# Patient Record
Sex: FEMALE | Race: WHITE | NOT HISPANIC OR LATINO | Employment: FULL TIME | URBAN - METROPOLITAN AREA
[De-identification: names, ages, dates, MRNs, and addresses within clinical notes are randomized per-mention and may not be internally consistent; named-entity substitution may affect disease eponyms.]

---

## 2023-02-22 PROBLEM — R87.612 LGSIL ON PAP SMEAR OF CERVIX: Status: ACTIVE | Noted: 2023-02-22

## 2023-02-22 PROBLEM — B35.1 ONYCHOMYCOSIS OF TOENAIL: Status: ACTIVE | Noted: 2023-02-22

## 2023-02-22 PROBLEM — S83.412A: Status: ACTIVE | Noted: 2023-02-22

## 2023-02-22 PROBLEM — M25.662 DECREASED RANGE OF MOTION OF LEFT KNEE: Status: ACTIVE | Noted: 2023-02-22

## 2023-02-22 PROBLEM — F17.200 NICOTINE DEPENDENCE: Status: ACTIVE | Noted: 2023-02-22

## 2023-02-22 PROBLEM — E55.9 VITAMIN D DEFICIENCY: Status: ACTIVE | Noted: 2023-02-22

## 2023-02-22 PROBLEM — F43.9 STRESS: Status: ACTIVE | Noted: 2023-02-22

## 2023-02-22 PROBLEM — J44.9 CHRONIC OBSTRUCTIVE PULMONARY DISEASE (MULTI): Status: ACTIVE | Noted: 2023-02-22

## 2023-02-22 PROBLEM — M25.561 KNEE PAIN, BILATERAL: Status: ACTIVE | Noted: 2023-02-22

## 2023-02-22 PROBLEM — R29.898 DECREASED STRENGTH INVOLVING KNEE JOINT: Status: ACTIVE | Noted: 2023-02-22

## 2023-02-22 PROBLEM — M17.9 OSTEOARTHRITIS OF KNEE: Status: ACTIVE | Noted: 2023-02-22

## 2023-02-22 PROBLEM — M19.90 ARTHRITIS: Status: ACTIVE | Noted: 2023-02-22

## 2023-02-22 PROBLEM — R79.89 ABNORMAL THYROID BLOOD TEST: Status: ACTIVE | Noted: 2023-02-22

## 2023-02-22 PROBLEM — L23.9 ALLERGIC CONTACT DERMATITIS: Status: ACTIVE | Noted: 2023-02-22

## 2023-02-22 PROBLEM — R63.5 WEIGHT GAIN: Status: ACTIVE | Noted: 2023-02-22

## 2023-02-22 PROBLEM — D53.1 MEGALOBLASTIC ERYTHROCYTES: Status: ACTIVE | Noted: 2023-02-22

## 2023-02-22 PROBLEM — M17.12 ARTHRITIS OF KNEE, LEFT: Status: ACTIVE | Noted: 2023-02-22

## 2023-02-22 PROBLEM — L98.9 SKIN LESION: Status: ACTIVE | Noted: 2023-02-22

## 2023-02-22 PROBLEM — R79.89 ABNORMAL LIVER FUNCTION TEST: Status: ACTIVE | Noted: 2023-02-22

## 2023-02-22 PROBLEM — G47.33 OBSTRUCTIVE SLEEP APNEA: Status: ACTIVE | Noted: 2023-02-22

## 2023-02-22 PROBLEM — I10 HYPERTENSION: Status: ACTIVE | Noted: 2023-02-22

## 2023-02-22 PROBLEM — I45.81 PROLONGED QT SYNDROME: Status: ACTIVE | Noted: 2023-02-22

## 2023-02-22 PROBLEM — M89.9 BONE DISEASE: Status: ACTIVE | Noted: 2023-02-22

## 2023-02-22 PROBLEM — R06.00 DYSPNEA: Status: ACTIVE | Noted: 2023-02-22

## 2023-02-22 PROBLEM — S83.242A TEAR OF MEDIAL MENISCUS OF LEFT KNEE: Status: ACTIVE | Noted: 2023-02-22

## 2023-02-22 PROBLEM — M22.2X2 PATELLOFEMORAL PAIN SYNDROME OF LEFT KNEE: Status: ACTIVE | Noted: 2023-02-22

## 2023-02-22 PROBLEM — M23.92 LOCKING OF LEFT KNEE: Status: ACTIVE | Noted: 2023-02-22

## 2023-02-22 PROBLEM — R06.02 SHORTNESS OF BREATH: Status: ACTIVE | Noted: 2023-02-22

## 2023-02-22 PROBLEM — M21.161 ACQUIRED GENU VARUM OF RIGHT LOWER EXTREMITY: Status: ACTIVE | Noted: 2023-02-22

## 2023-02-22 PROBLEM — R68.89 HEAT INTOLERANCE: Status: ACTIVE | Noted: 2023-02-22

## 2023-02-22 PROBLEM — R26.2 DIFFICULTY WALKING: Status: ACTIVE | Noted: 2023-02-22

## 2023-02-22 PROBLEM — R23.2 HOT FLASHES: Status: ACTIVE | Noted: 2023-02-22

## 2023-02-22 PROBLEM — D36.9 TUBULAR ADENOMA: Status: ACTIVE | Noted: 2023-02-22

## 2023-02-22 PROBLEM — M25.562 LEFT KNEE PAIN: Status: ACTIVE | Noted: 2023-02-22

## 2023-02-22 PROBLEM — M25.562 KNEE PAIN, BILATERAL: Status: ACTIVE | Noted: 2023-02-22

## 2023-02-22 PROBLEM — M25.462 KNEE EFFUSION, LEFT: Status: ACTIVE | Noted: 2023-02-22

## 2023-02-22 PROBLEM — E04.1 THYROID NODULE: Status: ACTIVE | Noted: 2023-02-22

## 2023-02-22 PROBLEM — M54.2 CERVICAL PAIN: Status: ACTIVE | Noted: 2023-02-22

## 2023-02-22 PROBLEM — F33.9 MAJOR DEPRESSION, RECURRENT (CMS-HCC): Status: RESOLVED | Noted: 2023-02-22 | Resolved: 2023-02-22

## 2023-02-22 PROBLEM — R87.810 ASCUS WITH POSITIVE HIGH RISK HPV CERVICAL: Status: ACTIVE | Noted: 2023-02-22

## 2023-02-22 PROBLEM — N90.89 VULVAR LESION: Status: ACTIVE | Noted: 2023-02-22

## 2023-02-22 PROBLEM — E78.9 LIPID DISORDER: Status: ACTIVE | Noted: 2023-02-22

## 2023-02-22 PROBLEM — M25.661 KNEE JOINT STIFFNESS, BILATERAL: Status: ACTIVE | Noted: 2023-02-22

## 2023-02-22 PROBLEM — R91.1 PULMONARY NODULE SEEN ON IMAGING STUDY: Status: ACTIVE | Noted: 2023-02-22

## 2023-02-22 PROBLEM — M25.662 KNEE JOINT STIFFNESS, BILATERAL: Status: ACTIVE | Noted: 2023-02-22

## 2023-02-22 PROBLEM — R87.610 ASCUS WITH POSITIVE HIGH RISK HPV CERVICAL: Status: ACTIVE | Noted: 2023-02-22

## 2023-02-22 PROBLEM — E07.9 THYROID DISEASE: Status: ACTIVE | Noted: 2023-02-22

## 2023-02-22 PROBLEM — M17.11 ARTHRITIS OF KNEE, RIGHT: Status: ACTIVE | Noted: 2023-02-22

## 2023-02-22 PROBLEM — K43.9 VENTRAL HERNIA: Status: ACTIVE | Noted: 2023-02-22

## 2023-02-22 PROBLEM — E66.9 OBESITY: Status: ACTIVE | Noted: 2023-02-22

## 2023-02-22 PROBLEM — M84.375A METATARSAL STRESS FRACTURE OF LEFT FOOT: Status: ACTIVE | Noted: 2023-02-22

## 2023-02-22 RX ORDER — MELOXICAM 7.5 MG/1
1 TABLET ORAL 2 TIMES DAILY PRN
COMMUNITY
Start: 2022-05-26 | End: 2024-04-30 | Stop reason: SDUPTHER

## 2023-02-22 RX ORDER — ATORVASTATIN CALCIUM 10 MG/1
10 TABLET, FILM COATED ORAL NIGHTLY
COMMUNITY
End: 2023-04-04 | Stop reason: SDUPTHER

## 2023-02-22 RX ORDER — BUPROPION HYDROCHLORIDE 300 MG/1
300 TABLET ORAL DAILY
COMMUNITY
End: 2023-09-29

## 2023-02-22 RX ORDER — FLUTICASONE PROPIONATE 50 MCG
1 SPRAY, SUSPENSION (ML) NASAL DAILY
COMMUNITY
End: 2023-11-14 | Stop reason: ALTCHOICE

## 2023-02-22 RX ORDER — CITALOPRAM 40 MG/1
1 TABLET, FILM COATED ORAL DAILY
COMMUNITY
Start: 2013-10-03 | End: 2023-03-08 | Stop reason: SDUPTHER

## 2023-02-22 RX ORDER — LOSARTAN POTASSIUM 50 MG/1
1 TABLET ORAL DAILY
COMMUNITY
Start: 2019-03-12 | End: 2023-04-04 | Stop reason: SDUPTHER

## 2023-03-08 DIAGNOSIS — F41.9 ANXIETY: Primary | ICD-10-CM

## 2023-03-15 RX ORDER — CITALOPRAM 40 MG/1
40 TABLET, FILM COATED ORAL DAILY
Qty: 90 TABLET | Refills: 3 | Status: SHIPPED | OUTPATIENT
Start: 2023-03-15

## 2023-04-03 PROBLEM — M25.569 KNEE PAIN: Status: ACTIVE | Noted: 2023-04-03

## 2023-04-03 PROBLEM — R06.02 SOB (SHORTNESS OF BREATH) ON EXERTION: Status: ACTIVE | Noted: 2023-04-03

## 2023-04-03 PROBLEM — R06.02 EXERTIONAL SHORTNESS OF BREATH: Status: ACTIVE | Noted: 2023-04-03

## 2023-04-03 PROBLEM — F17.210 CIGARETTE NICOTINE DEPENDENCE WITHOUT COMPLICATION: Status: ACTIVE | Noted: 2023-04-03

## 2023-04-03 PROBLEM — R06.00 DYSPNEA: Status: ACTIVE | Noted: 2023-04-03

## 2023-04-03 PROBLEM — R91.8 LUNG MASS: Status: ACTIVE | Noted: 2023-04-03

## 2023-04-03 PROBLEM — S83.419A KNEE MCL SPRAIN: Status: ACTIVE | Noted: 2023-04-03

## 2023-04-03 PROBLEM — E04.1 SOLITARY THYROID NODULE: Status: ACTIVE | Noted: 2023-04-03

## 2023-04-03 PROBLEM — E78.5 DYSLIPIDEMIA: Status: ACTIVE | Noted: 2023-04-03

## 2023-04-04 ENCOUNTER — OFFICE VISIT (OUTPATIENT)
Dept: PRIMARY CARE | Facility: CLINIC | Age: 58
End: 2023-04-04
Payer: COMMERCIAL

## 2023-04-04 VITALS
SYSTOLIC BLOOD PRESSURE: 119 MMHG | BODY MASS INDEX: 38.14 KG/M2 | TEMPERATURE: 96.6 F | DIASTOLIC BLOOD PRESSURE: 67 MMHG | HEART RATE: 77 BPM | WEIGHT: 243 LBS | HEIGHT: 67 IN

## 2023-04-04 DIAGNOSIS — M17.11 ARTHRITIS OF KNEE, RIGHT: ICD-10-CM

## 2023-04-04 DIAGNOSIS — E55.9 VITAMIN D DEFICIENCY: ICD-10-CM

## 2023-04-04 DIAGNOSIS — M17.12 ARTHRITIS OF KNEE, LEFT: ICD-10-CM

## 2023-04-04 DIAGNOSIS — R79.89 ABNORMAL LIVER FUNCTION TEST: ICD-10-CM

## 2023-04-04 DIAGNOSIS — I10 HYPERTENSION, UNSPECIFIED TYPE: Primary | ICD-10-CM

## 2023-04-04 DIAGNOSIS — G47.33 OBSTRUCTIVE SLEEP APNEA: ICD-10-CM

## 2023-04-04 LAB
ALANINE AMINOTRANSFERASE (SGPT) (U/L) IN SER/PLAS: 20 U/L (ref 7–45)
ALBUMIN (G/DL) IN SER/PLAS: 4.3 G/DL (ref 3.4–5)
ALKALINE PHOSPHATASE (U/L) IN SER/PLAS: 115 U/L (ref 33–110)
ASPARTATE AMINOTRANSFERASE (SGOT) (U/L) IN SER/PLAS: 15 U/L (ref 9–39)
BILIRUBIN DIRECT (MG/DL) IN SER/PLAS: 0.1 MG/DL (ref 0–0.3)
BILIRUBIN TOTAL (MG/DL) IN SER/PLAS: 0.5 MG/DL (ref 0–1.2)
CHOLESTEROL (MG/DL) IN SER/PLAS: 161 MG/DL (ref 0–199)
CHOLESTEROL IN HDL (MG/DL) IN SER/PLAS: 44.1 MG/DL
CHOLESTEROL/HDL RATIO: 3.7
LDL: 84 MG/DL (ref 0–99)
PROTEIN TOTAL: 6.8 G/DL (ref 6.4–8.2)
TRIGLYCERIDE (MG/DL) IN SER/PLAS: 167 MG/DL (ref 0–149)
VLDL: 33 MG/DL (ref 0–40)

## 2023-04-04 PROCEDURE — 1036F TOBACCO NON-USER: CPT | Performed by: FAMILY MEDICINE

## 2023-04-04 PROCEDURE — 3078F DIAST BP <80 MM HG: CPT | Performed by: FAMILY MEDICINE

## 2023-04-04 PROCEDURE — 80076 HEPATIC FUNCTION PANEL: CPT

## 2023-04-04 PROCEDURE — 99214 OFFICE O/P EST MOD 30 MIN: CPT | Performed by: FAMILY MEDICINE

## 2023-04-04 PROCEDURE — 36415 COLL VENOUS BLD VENIPUNCTURE: CPT | Performed by: FAMILY MEDICINE

## 2023-04-04 PROCEDURE — 80061 LIPID PANEL: CPT

## 2023-04-04 PROCEDURE — 3074F SYST BP LT 130 MM HG: CPT | Performed by: FAMILY MEDICINE

## 2023-04-04 RX ORDER — LOSARTAN POTASSIUM 50 MG/1
50 TABLET ORAL DAILY
Qty: 90 TABLET | Refills: 3 | Status: SHIPPED | OUTPATIENT
Start: 2023-04-04 | End: 2024-02-28

## 2023-04-04 RX ORDER — ATORVASTATIN CALCIUM 10 MG/1
10 TABLET, FILM COATED ORAL NIGHTLY
Qty: 3 TABLET | Refills: 3 | Status: SHIPPED | OUTPATIENT
Start: 2023-04-04 | End: 2023-06-05

## 2023-04-04 NOTE — PROGRESS NOTES
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

## 2023-06-04 DIAGNOSIS — I10 HYPERTENSION, UNSPECIFIED TYPE: ICD-10-CM

## 2023-06-05 RX ORDER — ATORVASTATIN CALCIUM 10 MG/1
TABLET, FILM COATED ORAL
Qty: 90 TABLET | Refills: 1 | Status: SHIPPED | OUTPATIENT
Start: 2023-06-05 | End: 2023-12-01

## 2023-09-27 ENCOUNTER — TELEPHONE (OUTPATIENT)
Dept: PRIMARY CARE | Facility: CLINIC | Age: 58
End: 2023-09-27
Payer: COMMERCIAL

## 2023-09-27 DIAGNOSIS — E55.9 VITAMIN D DEFICIENCY: ICD-10-CM

## 2023-09-27 DIAGNOSIS — I10 PRIMARY HYPERTENSION: Primary | ICD-10-CM

## 2023-09-27 DIAGNOSIS — Z00.00 HEALTH CARE MAINTENANCE: ICD-10-CM

## 2023-09-27 NOTE — TELEPHONE ENCOUNTER
Pt called states that she has an appointment for a physical on 10/30 and would like it if she can have her blood  drawn  prior to the appointment so that the results can be disused at the appointment

## 2023-09-29 DIAGNOSIS — E66.9 OBESITY, UNSPECIFIED: ICD-10-CM

## 2023-09-29 RX ORDER — BUPROPION HYDROCHLORIDE 300 MG/1
300 TABLET ORAL DAILY
Qty: 90 TABLET | Refills: 3 | Status: SHIPPED | OUTPATIENT
Start: 2023-09-29

## 2023-10-24 ENCOUNTER — CLINICAL SUPPORT (OUTPATIENT)
Dept: PRIMARY CARE | Facility: CLINIC | Age: 58
End: 2023-10-24
Payer: COMMERCIAL

## 2023-10-24 DIAGNOSIS — E55.9 VITAMIN D DEFICIENCY: ICD-10-CM

## 2023-10-24 DIAGNOSIS — Z00.00 HEALTH CARE MAINTENANCE: ICD-10-CM

## 2023-10-24 LAB
25(OH)D3 SERPL-MCNC: 41 NG/ML (ref 30–100)
ALBUMIN SERPL BCP-MCNC: 4.5 G/DL (ref 3.4–5)
ALP SERPL-CCNC: 100 U/L (ref 33–110)
ALT SERPL W P-5'-P-CCNC: 14 U/L (ref 7–45)
ANION GAP SERPL CALC-SCNC: 16 MMOL/L (ref 10–20)
AST SERPL W P-5'-P-CCNC: 13 U/L (ref 9–39)
BASOPHILS # BLD AUTO: 0.04 X10*3/UL (ref 0–0.1)
BASOPHILS NFR BLD AUTO: 0.6 %
BILIRUB SERPL-MCNC: 0.4 MG/DL (ref 0–1.2)
BUN SERPL-MCNC: 17 MG/DL (ref 6–23)
CALCIUM SERPL-MCNC: 9.5 MG/DL (ref 8.6–10.6)
CHLORIDE SERPL-SCNC: 108 MMOL/L (ref 98–107)
CO2 SERPL-SCNC: 22 MMOL/L (ref 21–32)
CREAT SERPL-MCNC: 1.05 MG/DL (ref 0.5–1.05)
EOSINOPHIL # BLD AUTO: 0.15 X10*3/UL (ref 0–0.7)
EOSINOPHIL NFR BLD AUTO: 2.1 %
ERYTHROCYTE [DISTWIDTH] IN BLOOD BY AUTOMATED COUNT: 12.7 % (ref 11.5–14.5)
GFR SERPL CREATININE-BSD FRML MDRD: 61 ML/MIN/1.73M*2
GLUCOSE SERPL-MCNC: 112 MG/DL (ref 74–99)
HCT VFR BLD AUTO: 41.1 % (ref 36–46)
HGB BLD-MCNC: 13.3 G/DL (ref 12–16)
IMM GRANULOCYTES # BLD AUTO: 0.03 X10*3/UL (ref 0–0.7)
IMM GRANULOCYTES NFR BLD AUTO: 0.4 % (ref 0–0.9)
LYMPHOCYTES # BLD AUTO: 1.92 X10*3/UL (ref 1.2–4.8)
LYMPHOCYTES NFR BLD AUTO: 27 %
MCH RBC QN AUTO: 31.2 PG (ref 26–34)
MCHC RBC AUTO-ENTMCNC: 32.4 G/DL (ref 32–36)
MCV RBC AUTO: 97 FL (ref 80–100)
MONOCYTES # BLD AUTO: 0.38 X10*3/UL (ref 0.1–1)
MONOCYTES NFR BLD AUTO: 5.3 %
NEUTROPHILS # BLD AUTO: 4.59 X10*3/UL (ref 1.2–7.7)
NEUTROPHILS NFR BLD AUTO: 64.6 %
NRBC BLD-RTO: 0 /100 WBCS (ref 0–0)
PLATELET # BLD AUTO: 234 X10*3/UL (ref 150–450)
PMV BLD AUTO: 10.9 FL (ref 7.5–11.5)
POTASSIUM SERPL-SCNC: 4.6 MMOL/L (ref 3.5–5.3)
PROT SERPL-MCNC: 7 G/DL (ref 6.4–8.2)
RBC # BLD AUTO: 4.26 X10*6/UL (ref 4–5.2)
SODIUM SERPL-SCNC: 141 MMOL/L (ref 136–145)
TSH SERPL-ACNC: 0.98 MIU/L (ref 0.44–3.98)
WBC # BLD AUTO: 7.1 X10*3/UL (ref 4.4–11.3)

## 2023-10-24 PROCEDURE — 80053 COMPREHEN METABOLIC PANEL: CPT

## 2023-10-24 PROCEDURE — 82306 VITAMIN D 25 HYDROXY: CPT

## 2023-10-24 PROCEDURE — 84443 ASSAY THYROID STIM HORMONE: CPT

## 2023-10-24 PROCEDURE — 36415 COLL VENOUS BLD VENIPUNCTURE: CPT

## 2023-10-24 PROCEDURE — 85025 COMPLETE CBC W/AUTO DIFF WBC: CPT

## 2023-10-30 ENCOUNTER — OFFICE VISIT (OUTPATIENT)
Dept: PRIMARY CARE | Facility: CLINIC | Age: 58
End: 2023-10-30
Payer: COMMERCIAL

## 2023-10-30 VITALS
WEIGHT: 248.9 LBS | DIASTOLIC BLOOD PRESSURE: 65 MMHG | BODY MASS INDEX: 39.07 KG/M2 | HEIGHT: 67 IN | SYSTOLIC BLOOD PRESSURE: 105 MMHG | TEMPERATURE: 97.8 F

## 2023-10-30 DIAGNOSIS — F17.210 SMOKING GREATER THAN 25 PACK YEARS: Primary | ICD-10-CM

## 2023-10-30 DIAGNOSIS — F43.9 STRESS: ICD-10-CM

## 2023-10-30 DIAGNOSIS — Z00.00 HEALTHCARE MAINTENANCE: ICD-10-CM

## 2023-10-30 LAB
APPEARANCE UR: CLEAR
BILIRUB UR QL STRIP: NEGATIVE
COLOR UR: YELLOW
GLUCOSE UR STRIP-MCNC: NEGATIVE MG/DL
HGB UR QL STRIP: NEGATIVE
KETONES UR STRIP-MCNC: NEGATIVE MG/DL
LEUKOCYTE ESTERASE UR QL STRIP: NEGATIVE
NITRITE UR QL STRIP: NEGATIVE
PH UR STRIP: 5.5 [PH]
PROT UR STRIP-MCNC: NEGATIVE MG/DL
SP GR UR STRIP.AUTO: <=1.005
UROBILINOGEN UR STRIP-ACNC: 0.2 E.U./DL

## 2023-10-30 PROCEDURE — 3078F DIAST BP <80 MM HG: CPT | Performed by: FAMILY MEDICINE

## 2023-10-30 PROCEDURE — 1036F TOBACCO NON-USER: CPT | Performed by: FAMILY MEDICINE

## 2023-10-30 PROCEDURE — 81003 URINALYSIS AUTO W/O SCOPE: CPT | Performed by: FAMILY MEDICINE

## 2023-10-30 PROCEDURE — 3074F SYST BP LT 130 MM HG: CPT | Performed by: FAMILY MEDICINE

## 2023-10-30 PROCEDURE — 99396 PREV VISIT EST AGE 40-64: CPT | Performed by: FAMILY MEDICINE

## 2023-10-30 NOTE — PATIENT INSTRUCTIONS
There are 6 petals that I want my patient to work on to activate the longevity genes .    Diet  2.movement    3.sleep    4. Mental health     5. Environment     6. Relationships

## 2023-10-30 NOTE — PROGRESS NOTES
"Subjective   Patient ID: Shayy Weeks is a 58 y.o. female who presents for Annual Exam.    HPI     Review of Systems   All other systems reviewed and are negative.      Objective   /65   Temp 36.6 °C (97.8 °F)   Ht 1.702 m (5' 7\")   Wt 113 kg (248 lb 14.4 oz)   BMI 38.98 kg/m²     Physical Exam  Cardiovascular:      Rate and Rhythm: Normal rate.   Pulmonary:      Effort: Pulmonary effort is normal.   Abdominal:      Palpations: Abdomen is soft.   Skin:     General: Skin is warm.   Neurological:      General: No focal deficit present.      Mental Status: She is alert.   Psychiatric:         Mood and Affect: Mood normal.         Assessment/Plan     This is a 58-year-old female patient who is here for her annual well exam    She is not yet ready to change her lifestyle for healthy living.  She has been going to the the pool Monday Wednesday and Thursday for water aerobics    But she is continuing to eat unhealthy especially sweets    We had a long discussion about lifestyle changes    Since she has a history of changed smoking over 25 years I will order another CAT scan to be done sometime next year January or February    I reviewed her medication no changes were made    She is under stress may be posttraumatic stress disorder for her to lead herself to eating unhealthy [facility]    She has been wearing her CPAP diligently    Advised patient to come back in 6 months         "

## 2023-11-14 ENCOUNTER — OFFICE VISIT (OUTPATIENT)
Dept: CARDIOLOGY | Facility: HOSPITAL | Age: 58
End: 2023-11-14
Payer: COMMERCIAL

## 2023-11-14 VITALS
HEART RATE: 70 BPM | HEIGHT: 67 IN | OXYGEN SATURATION: 98 % | SYSTOLIC BLOOD PRESSURE: 125 MMHG | DIASTOLIC BLOOD PRESSURE: 78 MMHG | WEIGHT: 250.6 LBS | BODY MASS INDEX: 39.33 KG/M2

## 2023-11-14 DIAGNOSIS — R06.02 SOB (SHORTNESS OF BREATH) ON EXERTION: Primary | ICD-10-CM

## 2023-11-14 DIAGNOSIS — I10 HYPERTENSION, UNSPECIFIED TYPE: ICD-10-CM

## 2023-11-14 DIAGNOSIS — E78.5 DYSLIPIDEMIA: ICD-10-CM

## 2023-11-14 PROCEDURE — 99214 OFFICE O/P EST MOD 30 MIN: CPT | Performed by: INTERNAL MEDICINE

## 2023-11-14 PROCEDURE — 3074F SYST BP LT 130 MM HG: CPT | Performed by: INTERNAL MEDICINE

## 2023-11-14 PROCEDURE — 99214 OFFICE O/P EST MOD 30 MIN: CPT | Mod: 25 | Performed by: INTERNAL MEDICINE

## 2023-11-14 PROCEDURE — 93010 ELECTROCARDIOGRAM REPORT: CPT | Performed by: INTERNAL MEDICINE

## 2023-11-14 PROCEDURE — 93005 ELECTROCARDIOGRAM TRACING: CPT | Performed by: INTERNAL MEDICINE

## 2023-11-14 PROCEDURE — 3078F DIAST BP <80 MM HG: CPT | Performed by: INTERNAL MEDICINE

## 2023-11-14 PROCEDURE — 1036F TOBACCO NON-USER: CPT | Performed by: INTERNAL MEDICINE

## 2023-11-14 RX ORDER — SPIRONOLACTONE 25 MG/1
25 TABLET ORAL DAILY
Qty: 90 TABLET | Refills: 3 | Status: SHIPPED | OUTPATIENT
Start: 2023-11-14 | End: 2024-11-13

## 2023-11-14 ASSESSMENT — ENCOUNTER SYMPTOMS
DEPRESSION: 0
OCCASIONAL FEELINGS OF UNSTEADINESS: 0
LOSS OF SENSATION IN FEET: 0

## 2023-11-14 ASSESSMENT — PATIENT HEALTH QUESTIONNAIRE - PHQ9
SUM OF ALL RESPONSES TO PHQ9 QUESTIONS 1 AND 2: 0
2. FEELING DOWN, DEPRESSED OR HOPELESS: NOT AT ALL
1. LITTLE INTEREST OR PLEASURE IN DOING THINGS: NOT AT ALL

## 2023-11-14 NOTE — PATIENT INSTRUCTIONS
Check echocardiogram.  Start spironolactone 25 mg daily.  Start Jardiance 10 mg daily.  Follow strict urinary hygiene as discussed.  Check BMP and lipid profile in 1 week.  Check another BMP in 1 month.  Follow a low potassium diet.  Follow-up in 3 months.

## 2023-11-14 NOTE — PROGRESS NOTES
Primary Care Physician: Aubrie Dewey MD  Date of Visit: 11/14/2023  9:20 AM EST  Location of visit: [facility]     Chief Complaint:   Chief Complaint   Patient presents with    Hypertension    Hyperlipidemia    Heart Murmur    Shortness of Breath        HPI / Summary:   Shayy Weeks is a 58 y.o. female presents for followup.  She does note increased dyspnea on exertion over the last year when walking up hills.  She does participate in water exercises 3 days a week for 45 minutes without chest pain or shortness of breath.  The patient denies chest pain,  palpitations, lightheadedness, syncope, orthopnea, paroxysmal nocturnal dyspnea, lower extremity edema, or bleeding problems.          Past Medical History:  Past Medical History:   Diagnosis Date    Atypical squamous cells of undetermined significance on cytologic smear of cervix (ASC-US) 08/26/2016    ASCUS with positive high risk HPV cervical    Carpal tunnel syndrome, right upper limb 10/29/2017    Carpal tunnel syndrome of right wrist    Cervical high risk human papillomavirus (HPV) DNA test positive 06/01/2015    Cervical high risk HPV (human papillomavirus) test positive    Chronic obstructive pulmonary disease with (acute) exacerbation (CMS/HCC) 09/21/2016    COPD exacerbation    Noninfective gastroenteritis and colitis, unspecified 12/29/2016    Acute gastroenteritis    Nontoxic single thyroid nodule 09/28/2020    Solitary thyroid nodule    Osteoarthritis of knee, unspecified 04/05/2022    Osteoarthritis of knee    Other abnormal cytological findings on specimens from cervix uteri 12/19/2014    Unexplained endometrial cells on cervical Pap smear    Other muscle spasm 03/31/2016    Cervical paraspinous muscle spasm    Other muscle spasm 03/31/2016    Trapezius muscle spasm    Pain in right hand 09/07/2017    Pain of right hand    Personal history of colonic polyps 07/27/2016    History of adenomatous polyp of colon    Personal history of  other diseases of the respiratory system 06/25/2014    History of acute pharyngitis    Personal history of other diseases of the respiratory system 03/17/2014    History of acute bronchitis    Personal history of other diseases of the respiratory system 03/17/2014    History of acute sinusitis    Personal history of other endocrine, nutritional and metabolic disease 07/19/2022    History of diabetes mellitus    Personal history of other specified conditions 09/21/2016    History of wheezing    Personal history of other specified conditions 12/28/2020    History of weight gain    Personal history of other specified conditions 08/02/2013    History of abdominal pain    Radiculopathy, cervical region 03/31/2016    Cervical radiculopathy, acute    Unspecified acute lower respiratory infection 06/29/2017    Acute lower respiratory infection        Past Surgical History:  Past Surgical History:   Procedure Laterality Date    CERVICAL FUSION  08/02/2013    Cervical Vertebral Fusion    LUMBAR LAMINECTOMY  08/02/2013    Laminectomy Lumbar    OTHER SURGICAL HISTORY  08/02/2013    Oophorectomy - Bilateral (Removal Of Both Ovaries)          Social History:   reports that she has quit smoking. Her smoking use included cigarettes. She has quit using smokeless tobacco. Alcohol use questions deferred to the physician. She reports that she does not use drugs.     Family History:  family history includes CARDIAC DISORDER in her father; Frontotemporal dementia in her mother.      Allergies:  Allergies   Allergen Reactions    Bee Sting Kit Other       Outpatient Medications:  Current Outpatient Medications   Medication Instructions    atorvastatin (Lipitor) 10 mg tablet TAKE 1 TABLET BY MOUTH EVERYDAY AT BEDTIME    buPROPion XL (WELLBUTRIN XL) 300 mg, oral, Daily    citalopram (CELEXA) 40 mg, oral, Daily    losartan (COZAAR) 50 mg, oral, Daily    meloxicam (Mobic) 7.5 mg tablet 1 tablet, oral, 2 times daily PRN       Physical  "Exam:  Vitals:    11/14/23 0931   BP: 125/78   BP Location: Right arm   Patient Position: Sitting   Pulse: 70   SpO2: 98%   Weight: 114 kg (250 lb 9.6 oz)   Height: 1.702 m (5' 7\")     Wt Readings from Last 5 Encounters:   11/14/23 114 kg (250 lb 9.6 oz)   10/30/23 113 kg (248 lb 14.4 oz)   07/10/23 110 kg (243 lb)   04/04/23 110 kg (243 lb)   11/15/22 108 kg (238 lb 0.5 oz)     Body mass index is 39.25 kg/m².   General: Well-developed well-nourished in no acute distress  HEENT: Normocephalic atraumatic  Neck: Supple, JVP v 12-14cm negative hepatojugular reflux 2+ carotid pulses without bruit  Pulmonary: Normal respiratory effort, clear to auscultation  Cardiovascular: No right ventricular heave, normal S1 and S2, 2 out of 6 early peaking systolic ejection murmur no rubs or gallops  Abdomen: Soft nontender nondistended  Extremities: Warm without edema 2+ radial pulses bilaterally 2+ posterior tibial pulses bilaterally  Neurologic: Alert and oriented x3  Psychiatric: Normal mood and affect     Last Labs:  CMP:  Recent Labs     10/24/23  0854 10/27/22  1341 12/03/21  0946 10/04/21  0832 07/16/21  1427    140 139 143 141   K 4.6 4.3 4.3 4.4 4.0   * 106 105 108* 106   CO2 22 25 26 25 24   ANIONGAP 16 13 12 14 11   BUN 17 18 17 19 17   CREATININE 1.05 0.90 0.92 0.95 1.0   EGFR 61  --   --   --  61   GLUCOSE 112* 82 110* 109* 112*     Recent Labs     10/24/23  0854 04/04/23  1005 10/27/22  1341 10/11/22  1310 04/05/22  0940   ALBUMIN 4.5 4.3 4.6 4.3 4.6   ALKPHOS 100 115* 104 111* 104   ALT 14 20 22 22 17   AST 13 15 17 17 15   BILITOT 0.4 0.5 0.5 0.5 0.6     CBC:  Recent Labs     10/24/23  0854 10/27/22  1341 12/03/21  0946 10/04/21  0832 07/16/21  1427   WBC 7.1 7.5 6.5 6.4 6.7   HGB 13.3 13.4 12.8 12.5 12.6   HCT 41.1 40.5 40.4 39.9 38.5    276 241 239 216   MCV 97 96 97 101* 97.0     COAG:   Recent Labs     11/16/21  0928 07/16/21  1427   INR  --  0.9   DDIMERVTE 329  --      HEME/ENDO:  Recent Labs " "    10/24/23  0854 10/27/22  1341 10/04/21  0832 01/27/21  0947   TSH 0.98 0.82 0.62 0.59      CARDIAC: No results for input(s): \"LDH\", \"CKMB\", \"TROPHS\", \"BNP\" in the last 72512 hours.    No lab exists for component: \"CK\", \"CKMBP\"  Recent Labs     04/04/23  1005 10/27/22  1341 04/05/22  0940   CHOL 161 145 129   LDLF 84 59 62   HDL 44.1 47.5 40.8   TRIG 167* 195* 130       Last Cardiology Tests:  ECG:  Echocardiogram performed today that I reviewed shows normal sinus rhythm and is normal.    Echo:  November 23, 2021  CONCLUSIONS:   1. The left ventricular systolic function is normal with a 55-60% estimated ejection fraction.          Cath:      Stress Test:  Stress Results:  No results found for this or any previous visit from the past 365 days.         Cardiac Imaging:  CT lung screening December 15, 2022  LUNGS AND AIRWAYS:  The trachea and central airways are patent. No endobronchial lesion.  There is minimal emphysematous changes with centrilobular  ground-glass opacities consistent with a component of respiratory  bronchiolitis.  There are no suspicious lung nodules.  No focal infiltrates or effusions.  There is minimal lingular atelectasis.     MEDIASTINUM AND NICK, LOWER NECK AND AXILLA:  There is asymmetric enlargement with central calcification of the  left lobe of the thyroid gland.  There are scattered mediastinal lymph nodes which are felt to be  reactive/inflammatory in nature.  Esophagus appears within normal limits as seen.     HEART AND VESSELS:  The thoracic aorta is of normal course and caliber without vascular  calcifications.  Main pulmonary artery and its branches are normal in caliber.  No coronary artery calcifications are seen.The study is not optimized  for evaluation of coronary arteries.  The cardiac chambers are not enlarged.  No evidence of pericardial effusion.    Assessment/Plan   Diagnoses and all orders for this visit:  SOB (shortness of breath) on exertion  -     ECG 12 lead (Clinic " Performed)  -     Transthoracic echo (TTE) complete; Future  -     perflutren lipid microspheres (Definity) injection 0.5-10 mL of dilution  -     sulfur hexafluoride microsphr (Lumason) injection 24.28 mg  -     perflutren protein A microsphere (Optison) injection 0.5 mL  Dyslipidemia  -     Lipid panel; Future  Hypertension, unspecified type  -     spironolactone (Aldactone) 25 mg tablet; Take 1 tablet (25 mg) by mouth once daily.  -     empagliflozin (Jardiance) 10 mg; Take 1 tablet (10 mg) by mouth once daily.  -     Basic metabolic panel; Future  -     Basic metabolic panel; Future    In summary Ms. Weeks is a pleasant 58 year-old white female with a past medical history significant for hypertension, hyperlipidemia with a CT calcium score of 0 in 2019, obesity, obstructive sleep apnea, and prior tobacco use.  She does note increasing dyspnea on exertion over the last year and has evidence of volume overload consistent with heart failure with preserved ejection fraction in the setting of obesity and hypertension.  I did start spironolactone and Jardiance as indicated above.  I did  her with regards to the side effects of Jardiance and the need for strict urinary hygiene.  I ordered labs as indicated above.  I encouraged her to continue to work on losing weight.  I ordered an echo to reassess her LV function.  We will see her back in follow-up in 3 months.      Orders:  No orders of the defined types were placed in this encounter.     Followup Appts:  Future Appointments   Date Time Provider Department Center   2/5/2024 11:00 AM [facility] [id number] [facility]inoff [facility]   4/29/2024  8:45 AM Aubrie Dewey MD [id number]           ____________________________________________________________  Jamaal Ames MD  [facility]  [facility] [place]

## 2023-11-22 ENCOUNTER — LAB (OUTPATIENT)
Dept: LAB | Facility: LAB | Age: 58
End: 2023-11-22
Payer: COMMERCIAL

## 2023-11-22 DIAGNOSIS — I10 HYPERTENSION, UNSPECIFIED TYPE: ICD-10-CM

## 2023-11-22 DIAGNOSIS — E78.5 DYSLIPIDEMIA: ICD-10-CM

## 2023-11-22 LAB
ANION GAP SERPL CALC-SCNC: 14 MMOL/L
BUN SERPL-MCNC: 22 MG/DL (ref 8–25)
CALCIUM SERPL-MCNC: 9.3 MG/DL (ref 8.5–10.4)
CHLORIDE SERPL-SCNC: 107 MMOL/L (ref 97–107)
CHOLEST SERPL-MCNC: 126 MG/DL (ref 133–200)
CHOLEST/HDLC SERPL: 3.2 {RATIO}
CO2 SERPL-SCNC: 21 MMOL/L (ref 24–31)
CREAT SERPL-MCNC: 1.1 MG/DL (ref 0.4–1.6)
GFR SERPL CREATININE-BSD FRML MDRD: 58 ML/MIN/1.73M*2
GLUCOSE SERPL-MCNC: 119 MG/DL (ref 65–99)
HDLC SERPL-MCNC: 39 MG/DL
LDLC SERPL CALC-MCNC: 60 MG/DL (ref 65–130)
POTASSIUM SERPL-SCNC: 4.5 MMOL/L (ref 3.4–5.1)
SODIUM SERPL-SCNC: 142 MMOL/L (ref 133–145)
TRIGL SERPL-MCNC: 133 MG/DL (ref 40–150)

## 2023-11-22 PROCEDURE — 80061 LIPID PANEL: CPT

## 2023-11-22 PROCEDURE — 36415 COLL VENOUS BLD VENIPUNCTURE: CPT

## 2023-11-22 PROCEDURE — 80048 BASIC METABOLIC PNL TOTAL CA: CPT

## 2023-11-30 DIAGNOSIS — I10 HYPERTENSION, UNSPECIFIED TYPE: ICD-10-CM

## 2023-12-01 RX ORDER — ATORVASTATIN CALCIUM 10 MG/1
TABLET, FILM COATED ORAL
Qty: 90 TABLET | Refills: 1 | Status: SHIPPED | OUTPATIENT
Start: 2023-12-01 | End: 2024-05-28

## 2023-12-03 LAB
ATRIAL RATE: 70 BPM
P AXIS: 61 DEGREES
P OFFSET: 198 MS
P ONSET: 140 MS
PR INTERVAL: 156 MS
Q ONSET: 218 MS
QRS COUNT: 12 BEATS
QRS DURATION: 82 MS
QT INTERVAL: 430 MS
QTC CALCULATION(BAZETT): 464 MS
QTC FREDERICIA: 452 MS
R AXIS: 8 DEGREES
T AXIS: 29 DEGREES
T OFFSET: 433 MS
VENTRICULAR RATE: 70 BPM

## 2023-12-04 ENCOUNTER — HOSPITAL ENCOUNTER (OUTPATIENT)
Dept: CARDIOLOGY | Facility: HOSPITAL | Age: 58
Discharge: HOME | End: 2023-12-04
Payer: COMMERCIAL

## 2023-12-04 VITALS
DIASTOLIC BLOOD PRESSURE: 78 MMHG | BODY MASS INDEX: 39.24 KG/M2 | SYSTOLIC BLOOD PRESSURE: 125 MMHG | WEIGHT: 250 LBS | HEIGHT: 67 IN

## 2023-12-04 DIAGNOSIS — R06.02 SOB (SHORTNESS OF BREATH) ON EXERTION: ICD-10-CM

## 2023-12-04 PROCEDURE — 93306 TTE W/DOPPLER COMPLETE: CPT

## 2023-12-04 PROCEDURE — 93306 TTE W/DOPPLER COMPLETE: CPT | Performed by: INTERNAL MEDICINE

## 2023-12-08 LAB
AORTIC VALVE MEAN GRADIENT: 6.9
AORTIC VALVE PEAK VELOCITY: 1.9
AV PEAK GRADIENT: 14.4
EJECTION FRACTION APICAL 4 CHAMBER: 65.1
EJECTION FRACTION: 64
LEFT ATRIUM VOLUME AREA LENGTH INDEX BSA: 47.9
LEFT VENTRICLE INTERNAL DIMENSION DIASTOLE: 4.82 (ref 3.5–6)
LEFT VENTRICULAR OUTFLOW TRACT DIAMETER: 2.1
MITRAL VALVE E/A RATIO: 0.86
MITRAL VALVE E/E' RATIO: 8.31
RIGHT VENTRICLE FREE WALL PEAK S': 13
RIGHT VENTRICLE PEAK SYSTOLIC PRESSURE: 24.7
TRICUSPID ANNULAR PLANE SYSTOLIC EXCURSION: 2.8

## 2023-12-18 ENCOUNTER — HOSPITAL ENCOUNTER (OUTPATIENT)
Dept: RADIOLOGY | Facility: HOSPITAL | Age: 58
Discharge: HOME | End: 2023-12-18
Payer: COMMERCIAL

## 2023-12-18 ENCOUNTER — LAB (OUTPATIENT)
Dept: LAB | Facility: LAB | Age: 58
End: 2023-12-18
Payer: COMMERCIAL

## 2023-12-18 DIAGNOSIS — E04.1 THYROID NODULE: Primary | ICD-10-CM

## 2023-12-18 DIAGNOSIS — I10 HYPERTENSION, UNSPECIFIED TYPE: ICD-10-CM

## 2023-12-18 DIAGNOSIS — F17.210 SMOKING GREATER THAN 25 PACK YEARS: ICD-10-CM

## 2023-12-18 LAB
ANION GAP SERPL CALC-SCNC: 14 MMOL/L
BUN SERPL-MCNC: 22 MG/DL (ref 8–25)
CALCIUM SERPL-MCNC: 9.1 MG/DL (ref 8.5–10.4)
CHLORIDE SERPL-SCNC: 107 MMOL/L (ref 97–107)
CO2 SERPL-SCNC: 18 MMOL/L (ref 24–31)
CREAT SERPL-MCNC: 1 MG/DL (ref 0.4–1.6)
GFR SERPL CREATININE-BSD FRML MDRD: 65 ML/MIN/1.73M*2
GLUCOSE SERPL-MCNC: 118 MG/DL (ref 65–99)
POTASSIUM SERPL-SCNC: 4.8 MMOL/L (ref 3.4–5.1)
SODIUM SERPL-SCNC: 139 MMOL/L (ref 133–145)

## 2023-12-18 PROCEDURE — 71271 CT THORAX LUNG CANCER SCR C-: CPT

## 2023-12-18 PROCEDURE — 80048 BASIC METABOLIC PNL TOTAL CA: CPT

## 2023-12-18 PROCEDURE — 36415 COLL VENOUS BLD VENIPUNCTURE: CPT

## 2023-12-19 DIAGNOSIS — I10 PRIMARY HYPERTENSION: Primary | ICD-10-CM

## 2024-01-30 ENCOUNTER — APPOINTMENT (OUTPATIENT)
Dept: PRIMARY CARE | Facility: CLINIC | Age: 59
End: 2024-01-30
Payer: COMMERCIAL

## 2024-02-05 ENCOUNTER — HOSPITAL ENCOUNTER (OUTPATIENT)
Dept: RADIOLOGY | Facility: CLINIC | Age: 59
Discharge: HOME | End: 2024-02-05
Payer: COMMERCIAL

## 2024-02-05 DIAGNOSIS — Z78.0 ASYMPTOMATIC MENOPAUSAL STATE: ICD-10-CM

## 2024-02-05 PROCEDURE — 77080 DXA BONE DENSITY AXIAL: CPT

## 2024-02-05 PROCEDURE — 77081 DXA BONE DENSITY APPENDICULR: CPT | Performed by: RADIOLOGY

## 2024-02-06 NOTE — RESULT ENCOUNTER NOTE
The bone density test is normal.  In menopause, I recommend calcium, vitamin D and weightbearing exercise to maintain bone health.  The next bone density test is typically repeated in 3 to 5 years.

## 2024-02-15 ENCOUNTER — OFFICE VISIT (OUTPATIENT)
Dept: CARDIOLOGY | Facility: HOSPITAL | Age: 59
End: 2024-02-15
Payer: COMMERCIAL

## 2024-02-15 VITALS
BODY MASS INDEX: 37.67 KG/M2 | OXYGEN SATURATION: 97 % | HEART RATE: 86 BPM | DIASTOLIC BLOOD PRESSURE: 71 MMHG | WEIGHT: 240 LBS | SYSTOLIC BLOOD PRESSURE: 113 MMHG | HEIGHT: 67 IN

## 2024-02-15 DIAGNOSIS — I10 HYPERTENSION, UNSPECIFIED TYPE: Primary | ICD-10-CM

## 2024-02-15 DIAGNOSIS — I50.32 CHRONIC DIASTOLIC HEART FAILURE (MULTI): ICD-10-CM

## 2024-02-15 DIAGNOSIS — R06.02 SOB (SHORTNESS OF BREATH) ON EXERTION: ICD-10-CM

## 2024-02-15 DIAGNOSIS — E78.5 DYSLIPIDEMIA: ICD-10-CM

## 2024-02-15 PROCEDURE — 1036F TOBACCO NON-USER: CPT | Performed by: NURSE PRACTITIONER

## 2024-02-15 PROCEDURE — 3074F SYST BP LT 130 MM HG: CPT | Performed by: NURSE PRACTITIONER

## 2024-02-15 PROCEDURE — 99214 OFFICE O/P EST MOD 30 MIN: CPT | Performed by: NURSE PRACTITIONER

## 2024-02-15 PROCEDURE — 3078F DIAST BP <80 MM HG: CPT | Performed by: NURSE PRACTITIONER

## 2024-02-15 ASSESSMENT — ENCOUNTER SYMPTOMS
DEPRESSION: 0
OCCASIONAL FEELINGS OF UNSTEADINESS: 0
LOSS OF SENSATION IN FEET: 0

## 2024-02-15 NOTE — PROGRESS NOTES
Primary Care Physician: Aubrie Dewey MD  Date of Visit: 02/15/2024  9:20 AM EST  Location of visit: [facility]     Chief Complaint:   Chief Complaint   Patient presents with    Hypertension    Hyperlipidemia        HPI / Summary:   Shayy Weeks is a 58 y.o. female presents for followup. Seen in collaboration with Dr. Ames. She continues to have dyspnea on exertion walking briskly prolonged distances. No chest pain. She does participate in water exercises 3 days a week for 45 minutes without chest pain or shortness of breath. The patient denies chest pain, palpitations, lightheadedness, syncope, orthopnea, paroxysmal nocturnal dyspnea, lower extremity edema, or bleeding problems.              Past Medical History:  Past Medical History:   Diagnosis Date    Atypical squamous cells of undetermined significance on cytologic smear of cervix (ASC-US) 08/26/2016    ASCUS with positive high risk HPV cervical    Carpal tunnel syndrome, right upper limb 10/29/2017    Carpal tunnel syndrome of right wrist    Cervical high risk human papillomavirus (HPV) DNA test positive 06/01/2015    Cervical high risk HPV (human papillomavirus) test positive    Chronic obstructive pulmonary disease with (acute) exacerbation (CMS/HCC) 09/21/2016    COPD exacerbation    Noninfective gastroenteritis and colitis, unspecified 12/29/2016    Acute gastroenteritis    Nontoxic single thyroid nodule 09/28/2020    Solitary thyroid nodule    Osteoarthritis of knee, unspecified 04/05/2022    Osteoarthritis of knee    Other abnormal cytological findings on specimens from cervix uteri 12/19/2014    Unexplained endometrial cells on cervical Pap smear    Other muscle spasm 03/31/2016    Cervical paraspinous muscle spasm    Other muscle spasm 03/31/2016    Trapezius muscle spasm    Pain in right hand 09/07/2017    Pain of right hand    Personal history of colonic polyps 07/27/2016    History of adenomatous polyp of colon    Personal  history of other diseases of the respiratory system 06/25/2014    History of acute pharyngitis    Personal history of other diseases of the respiratory system 03/17/2014    History of acute bronchitis    Personal history of other diseases of the respiratory system 03/17/2014    History of acute sinusitis    Personal history of other endocrine, nutritional and metabolic disease 07/19/2022    History of diabetes mellitus    Personal history of other specified conditions 09/21/2016    History of wheezing    Personal history of other specified conditions 12/28/2020    History of weight gain    Personal history of other specified conditions 08/02/2013    History of abdominal pain    Radiculopathy, cervical region 03/31/2016    Cervical radiculopathy, acute    Unspecified acute lower respiratory infection 06/29/2017    Acute lower respiratory infection        Past Surgical History:  Past Surgical History:   Procedure Laterality Date    CERVICAL FUSION  08/02/2013    Cervical Vertebral Fusion    LUMBAR LAMINECTOMY  08/02/2013    Laminectomy Lumbar    OTHER SURGICAL HISTORY  08/02/2013    Oophorectomy - Bilateral (Removal Of Both Ovaries)          Social History:   reports that she has quit smoking. Her smoking use included cigarettes. She has a 10.00 pack-year smoking history. She has quit using smokeless tobacco. She reports current alcohol use of about 1.0 standard drink of alcohol per week. She reports that she does not use drugs.     Family History:  family history includes CARDIAC DISORDER in her father; Frontotemporal dementia in her mother.      Allergies:  Allergies   Allergen Reactions    Bee Venom Protein (Honey Bee) Unknown       Outpatient Medications:  Current Outpatient Medications   Medication Instructions    atorvastatin (Lipitor) 10 mg tablet TAKE 1 TABLET BY MOUTH EVERYDAY AT BEDTIME    buPROPion XL (WELLBUTRIN XL) 300 mg, oral, Daily    citalopram (CELEXA) 40 mg, oral, Daily    empagliflozin (JARDIANCE)  "10 mg, oral, Daily    losartan (COZAAR) 50 mg, oral, Daily    meloxicam (Mobic) 7.5 mg tablet 1 tablet, oral, 2 times daily PRN    spironolactone (ALDACTONE) 25 mg, oral, Daily       Physical Exam:  Vitals:    02/15/24 0904   BP: 113/71   BP Location: Left arm   Patient Position: Sitting   BP Cuff Size: Adult   Pulse: 86   SpO2: 97%   Weight: 109 kg (240 lb)   Height: 1.702 m (5' 7\")     Wt Readings from Last 5 Encounters:   02/15/24 109 kg (240 lb)   12/04/23 113 kg (250 lb)   11/14/23 114 kg (250 lb 9.6 oz)   10/30/23 113 kg (248 lb 14.4 oz)   07/10/23 110 kg (243 lb)     Body mass index is 37.59 kg/m².     GENERAL: alert, cooperative, pleasant, in no acute distress  SKIN: warm and dry  NECK: Normal JVD, negative HJR  CARDIAC: Regular rate and rhythm with 2/6 early peaking systolic murmur, no rubs or gallops  CHEST: Normal respiratory efforts, lungs clear to auscultation bilaterally.  ABDOMEN: soft, nontender, nondistended  EXTREMITIES: no edema, +2 palpable RP and PT pulses bilaterally       Last Labs:  Recent Labs     10/24/23  0854 10/27/22  1341 12/03/21  0946   WBC 7.1 7.5 6.5   HGB 13.3 13.4 12.8   HCT 41.1 40.5 40.4    276 241   MCV 97 96 97     Recent Labs     12/18/23  0833 11/22/23  1030 10/24/23  0854    142 141   K 4.8 4.5 4.6    107 108*   BUN 22 22 17   CREATININE 1.00 1.10 1.05     CMP -  Lab Results   Component Value Date    CALCIUM 9.1 12/18/2023    PROT 7.0 10/24/2023    ALBUMIN 4.5 10/24/2023    AST 13 10/24/2023    ALT 14 10/24/2023    ALKPHOS 100 10/24/2023    BILITOT 0.4 10/24/2023       LIPID PANEL -   Lab Results   Component Value Date    CHOL 126 (L) 11/22/2023    HDL 39.0 (L) 11/22/2023    LDLF 84 04/04/2023    TRIG 133 11/22/2023       No results found for: \"BNP\", \"HGBA1C\"    Last Cardiology Tests:  ECG:  Reviewed EKG from 11/14/23- normal sinus rhythm HR 70    Echo:  Echo Results:  Transthoracic Echo (TTE) Complete 12/04/2023    [facility], [address] [place] [address]  Tel [phone] and Fax [phone]    TRANSTHORACIC ECHOCARDIOGRAM REPORT      Patient Name:     KLARISSA ADAMS      Reading Physician:  [id number] Ames MD  Study Date:       12/4/2023           Ordering Provider:  [id number] ASH QUINTEROS KEREN  MRN/PID:          [id number]            Fellow:  Accession#:       [id number]        Nurse:  Date of           1965 / 58      Sonographer:        Lamonte Mcdermott RDMS  Birth/Age:        years  Gender:           F                   Additional Staff:   Elda Hartman RD  Height:           170.00 cm           Admit Date:  Weight:           114.00 kg           Admission Status:   Outpatient  BSA:              2.23 m2             Encounter#:         [id number]  Department          [facility]  Location:           Invasive  Blood Pressure: 125 /78 mmHg    Study Type:    TRANSTHORACIC ECHO (TTE) COMPLETE  Diagnosis/ICD: Shortness of breath-R06.02  Indication:    SOB  CPT Code:      Echo Complete w Full Doppler-[id number]    Patient History:  Pertinent History: Cardiomyopathy and HTN.    Study Detail: The following Echo studies were performed: 2D, M-Mode, Doppler and  color flow.      PHYSICIAN INTERPRETATION:  Left Ventricle: The left ventricular systolic function is normal, with an estimated ejection fraction of 60-65%. There are no regional wall motion abnormalities. The left ventricular cavity size is normal. Spectral Doppler shows a normal pattern of left ventricular diastolic filling.  Left Atrium: The left atrium is moderately dilated.  Right Ventricle: The right ventricle is normal in size. There is normal right ventricular global systolic function.  Right Atrium: The right atrium is normal in size.  Aortic Valve: The aortic valve is trileaflet. There is trivial aortic valve regurgitation. The peak instantaneous gradient of the aortic valve is 14.4 mmHg. The mean gradient of the aortic valve is 6.9 mmHg.  Mitral Valve: The mitral valve is  normal in structure. There is mild mitral valve regurgitation.  Tricuspid Valve: The tricuspid valve is structurally normal. There is mild tricuspid regurgitation. The Doppler estimated RVSP is within normal limits at 24.7 mmHg.  Pulmonic Valve: The pulmonic valve is structurally normal. There is no indication of pulmonic valve regurgitation.  Pericardium: There is no pericardial effusion noted.  Aorta: The aortic root is normal.  In comparison to the previous echocardiogram(s): Compared with study from 11/23/2021, no significant change.      CONCLUSIONS:  1. Left ventricular systolic function is normal with a 60-65% estimated ejection fraction.  2. The left atrium is moderately dilated.  3. RVSP within normal limits.    QUANTITATIVE DATA SUMMARY:  2D MEASUREMENTS:  Normal Ranges:  IVSd:          1.06 cm   (0.6-1.1cm)  LVPWd:         0.96 cm   (0.6-1.1cm)  LVIDd:         4.82 cm   (3.9-5.9cm)  LVIDs:         3.20 cm  LV Mass Index: 78.1 g/m2  LV % FS        33.6 %    LA VOLUME:  Normal Ranges:  LA Vol A4C:        103.9 ml   (22+/-6mL/m2)  LA Vol A2C:        99.1 ml  LA Vol BP:         106.7 ml  LA Vol Index A4C:  46.7 ml/m2  LA Vol Index A2C:  44.5 ml/m2  LA Vol Index BP:   47.9 ml/m2  LA Area A4C:       26.4 cm2  LA Area A2C:       27.1 cm2  LA Major Axis A4C: 5.7 cm  LA Major Axis A2C: 6.3 cm  LA Volume Index:   47.9 ml/m2  LA Vol A4C:        91.7 ml  LA Vol A2C:        94.0 ml    RA VOLUME BY A/L METHOD:  Normal Ranges:  RA Vol A4C:        63.1 ml    (8.3-19.5ml)  RA Vol Index A4C:  28.3 ml/m2  RA Area A4C:       20.2 cm2  RA Major Axis A4C: 5.5 cm    AORTA MEASUREMENTS:  Normal Ranges:  Ao Sinus, d: 3.00 cm (2.1-3.5cm)  Ao STJ, d:   2.60 cm (1.7-3.4cm)  Asc Ao, d:   3.10 cm (2.1-3.4cm)    LV SYSTOLIC FUNCTION BY 2D PLANIMETRY (MOD):  Normal Ranges:  EF-A4C View: 65.1 % (>=55%)  EF-A2C View: 62.4 %  EF-Biplane:  63.5 %    LV DIASTOLIC FUNCTION:  Normal Ranges:  MV Peak E:        0.83 m/s    (0.7-1.2 m/s)  MV Peak  A:        0.97 m/s    (0.42-0.7 m/s)  E/A Ratio:        0.86        (1.0-2.2)  MV e'             0.10 m/s    (>8.0)  MV lateral e'     0.10 m/s  MV medial e'      0.06 m/s  MV A Dur:         200.69 msec  E/e' Ratio:       8.31        (<8.0)  PulmV Sys Davian:    59.44 cm/s  PulmV Contreras Davian:   26.23 cm/s  PulmV S/D Davian:    2.27  PulmV A Revs Davian: 29.12 cm/s  PulmV A Revs Dur: 113.03 msec    MITRAL VALVE:  Normal Ranges:  MV DT: 308 msec (150-240msec)    AORTIC VALVE:  Normal Ranges:  AoV Vmax:      1.90 m/s  (<=1.7m/s)  AoV Peak P.4 mmHg (<20mmHg)  AoV Mean P.9 mmHg  (1.7-11.5mmHg)  AoV VTI:       40.98 cm  (18-25cm)  LVOT Diameter: 2.10 cm   (1.8-2.4cm)      RIGHT VENTRICLE:  RV Basal 4.30 cm  RV Mid   3.40 cm  RV Major 6.4 cm  TAPSE:   28.0 mm  RV s'    0.13 m/s    TRICUSPID VALVE/RVSP:  Normal Ranges:  Peak TR Velocity: 2.33 m/s  Est. RA Pressure: 3 mmHg  RV Syst Pressure: 24.7 mmHg (< 30mmHg)  IVC Diam:         1.20 cm    PULMONIC VALVE:  Normal Ranges:  PV Max Davian: 1.1 m/s  (0.6-0.9m/s)  PV Max P.4 mmHg    Pulmonary Veins:  PulmV A Revs Dur: 113.03 msec  PulmV A Revs Davian: 29.12 cm/s  PulmV Contreras Davian:   26.23 cm/s  PulmV S/D Davian:    2.27  PulmV Sys Davian:    59.44 cm/s      [id number] Jamaal Ames MD  Electronically signed on 2023 at 8:42:46 AM        ** Final **               Assessment/Plan   Problem List Items Addressed This Visit          Cardiac and Vasculature    Hypertension - Primary    Dyslipidemia    SOB (shortness of breath) on exertion    Relevant Orders    Referral to Pulmonology     Other Visit Diagnoses       Chronic diastolic heart failure (CMS/[facility])              In summary Ms. Weeks is a pleasant 58 year-old white female with a past medical history significant for hypertension, hyperlipidemia with a CT calcium score of 0 in 2019, obesity, obstructive sleep apnea, and prior tobacco use. Her dyspnea on exertion walking briskly prolonged distances remains unchanged despite adding  Jardiance and Spironolactone. She appears euvolemic by clinical exam today. I have referred her to pulmonary medicine as she has a prior history of tobacco use. She will have blood work done as ordered in March. Her blood pressure is controlled. I suspect her dyspnea on exertion is multifactorial due to diastolic heart failure, deconditioning, and obesity. Her recent echocardiogram showed normal LV function without significant valve abnormalities. She will continue current cardiovascular medications. We will see her back in follow-up in 3 months.       Orders:  No orders of the defined types were placed in this encounter.     Followup Appts:  Future Appointments   Date Time Provider Department Center   3/5/2024  3:45 PM Gisselle Padgett MD [id number] [facility]   5/6/2024  9:00 AM Aubrie Dewey MD [id number] [facility]           ____________________________________________________________  Lucia Anderson, APRN-CNP  [facility]  [facility]

## 2024-02-15 NOTE — PATIENT INSTRUCTIONS
Check BMP in March as ordered  Continue current cardiovascular medications  Encourage weight loss  Referral to pulmonary medicine- Dr. Russ  Follow up in 3 months

## 2024-02-28 DIAGNOSIS — I10 HYPERTENSION, UNSPECIFIED TYPE: ICD-10-CM

## 2024-02-28 RX ORDER — LOSARTAN POTASSIUM 50 MG/1
50 TABLET ORAL DAILY
Qty: 90 TABLET | Refills: 3 | Status: SHIPPED | OUTPATIENT
Start: 2024-02-28

## 2024-03-05 ENCOUNTER — OFFICE VISIT (OUTPATIENT)
Dept: ENDOCRINOLOGY | Facility: CLINIC | Age: 59
End: 2024-03-05
Payer: COMMERCIAL

## 2024-03-05 VITALS
WEIGHT: 240.6 LBS | HEIGHT: 67 IN | RESPIRATION RATE: 16 BRPM | DIASTOLIC BLOOD PRESSURE: 64 MMHG | BODY MASS INDEX: 37.76 KG/M2 | HEART RATE: 68 BPM | SYSTOLIC BLOOD PRESSURE: 120 MMHG

## 2024-03-05 DIAGNOSIS — E04.1 THYROID NODULE: ICD-10-CM

## 2024-03-05 DIAGNOSIS — E04.2 MULTINODULAR GOITER: Primary | ICD-10-CM

## 2024-03-05 PROCEDURE — 3074F SYST BP LT 130 MM HG: CPT | Performed by: INTERNAL MEDICINE

## 2024-03-05 PROCEDURE — 1036F TOBACCO NON-USER: CPT | Performed by: INTERNAL MEDICINE

## 2024-03-05 PROCEDURE — 99213 OFFICE O/P EST LOW 20 MIN: CPT | Performed by: INTERNAL MEDICINE

## 2024-03-05 PROCEDURE — 3078F DIAST BP <80 MM HG: CPT | Performed by: INTERNAL MEDICINE

## 2024-03-05 RX ORDER — ALBUTEROL SULFATE 90 UG/1
AEROSOL, METERED RESPIRATORY (INHALATION)
COMMUNITY
Start: 2024-01-30

## 2024-03-05 ASSESSMENT — ENCOUNTER SYMPTOMS
FATIGUE: 0
HEADACHES: 0
SHORTNESS OF BREATH: 0
COUGH: 0
PALPITATIONS: 0
DIARRHEA: 0
FEVER: 0
CHILLS: 0
VOMITING: 0
NAUSEA: 0

## 2024-03-05 NOTE — PROGRESS NOTES
Endocrinology: Follow up visit  Subjective   Patient ID: Shayy Weeks is a 58 y.o. female who presents for Goiter.    PCP: Aubrie Dewey MD    HPI  Last seen 3/2022.  Hx of thyroid nodule on left s/p fna 2020 with benign path.   Last us 2022 was stable from previous.   Lost to follow up until thyroid nodules seen on ct lung screening.   She notes no change in neck, no dysphagia.  Still with heat intolerance that is long standing.  Tsh in fall 2023 was normal    Review of Systems   Constitutional:  Negative for chills, fatigue and fever.   Respiratory:  Negative for cough and shortness of breath.    Cardiovascular:  Negative for chest pain and palpitations.   Gastrointestinal:  Negative for diarrhea, nausea and vomiting.   Neurological:  Negative for headaches.       Patient Active Problem List   Diagnosis    Abnormal liver function test    Abnormal thyroid blood test    Acquired genu varum of right lower extremity    Arthritis    Allergic contact dermatitis    ASCUS with positive high risk HPV cervical    Bone disease    Cervical pain    Chronic obstructive pulmonary disease (CMS/HCC)    Decreased range of motion of left knee    Decreased strength involving knee joint    Arthritis of knee, left    Arthritis of knee, right    Difficulty walking    Shortness of breath    Heat intolerance    Hot flashes    Hypertension    Knee effusion, left    Knee joint stiffness, bilateral    Knee pain, bilateral    Left knee pain    LGSIL on Pap smear of cervix    Lipid disorder    Locking of left knee    Megaloblastic erythrocytes    Metatarsal stress fracture of left foot    Nicotine dependence    Obesity    Obstructive sleep apnea    Onychomycosis of toenail    Osteoarthritis of knee    Patellofemoral pain syndrome of left knee    Prolonged QT syndrome    Pulmonary nodule seen on imaging study    Stress    Tear of medial meniscus of left knee    Tear, knee, medial collateral ligament, left, initial encounter     Thyroid disease    Thyroid nodule    Tubular adenoma    Ventral hernia    Vitamin D deficiency    Vulvar lesion    Weight gain    Skin lesion    Cigarette nicotine dependence without complication    Dyslipidemia    Exertional shortness of breath    Dyspnea    Knee MCL sprain    Lung mass    Knee pain    Solitary thyroid nodule    SOB (shortness of breath) on exertion        Home Meds:  Current Outpatient Medications   Medication Instructions    albuterol 90 mcg/actuation inhaler INHALE 2 PUFFS BY MOUTH 4 TIMES A DAY FOR 30 DAYS    atorvastatin (Lipitor) 10 mg tablet TAKE 1 TABLET BY MOUTH EVERYDAY AT BEDTIME    buPROPion XL (WELLBUTRIN XL) 300 mg, oral, Daily    citalopram (CELEXA) 40 mg, oral, Daily    empagliflozin (JARDIANCE) 10 mg, oral, Daily    losartan (COZAAR) 50 mg, oral, Daily    meloxicam (Mobic) 7.5 mg tablet 1 tablet, oral, 2 times daily PRN    spironolactone (ALDACTONE) 25 mg, oral, Daily        Allergies   Allergen Reactions    Bee Venom Protein (Honey Bee) Unknown        Objective   Vitals:    03/05/24 1549   BP: 120/64   Pulse: 68   Resp: 16      Vitals:    03/05/24 1549   Weight: 109 kg (240 lb 9.6 oz)      Body mass index is 37.68 kg/m².   Physical Exam  Constitutional:       Appearance: Normal appearance. She is overweight.   HENT:      Head: Normocephalic and atraumatic.   Neck:      Thyroid: Thyroid mass and thyromegaly present. No thyroid tenderness.      Comments: Palpable nodule 2 cm on left deep in neck  Cardiovascular:      Rate and Rhythm: Normal rate and regular rhythm.      Heart sounds: No murmur heard.     No gallop.   Pulmonary:      Effort: Pulmonary effort is normal.      Breath sounds: Normal breath sounds.   Abdominal:      Palpations: Abdomen is soft.      Comments: benign   Neurological:      General: No focal deficit present.      Mental Status: She is alert and oriented to person, place, and time.      Deep Tendon Reflexes: Reflexes are normal and symmetric.   Psychiatric:          Behavior: Behavior is cooperative.         Labs:  Lab Results   Component Value Date    TSH 0.98 10/24/2023    FREET4 0.86 01/27/2021      Lab Results   Component Value Date    THYROIDPAB 38 01/27/2021    TSI <1.0 01/27/2021        Assessment/Plan   Problem List Items Addressed This Visit    None  Visit Diagnoses       Multinodular goiter    -  Primary    Relevant Orders    US thyroid          We reviewed course and recc eval of nodules.  Will order repeat us.  If stable will follow up in 2 yrs.  Reassured past fna was benign  Electronically signed by:  Gisselle Padgett MD 03/05/24 5:11 PM

## 2024-03-05 NOTE — PATIENT INSTRUCTIONS
Please schedule thyroid ultrasound  If stable recommend follow up in 2 yrs  Please call with concerns or questions

## 2024-03-21 ENCOUNTER — LAB (OUTPATIENT)
Dept: LAB | Facility: LAB | Age: 59
End: 2024-03-21
Payer: COMMERCIAL

## 2024-03-21 DIAGNOSIS — I10 PRIMARY HYPERTENSION: ICD-10-CM

## 2024-03-21 LAB
ANION GAP SERPL CALC-SCNC: 13 MMOL/L
BUN SERPL-MCNC: 14 MG/DL (ref 8–25)
CALCIUM SERPL-MCNC: 9.7 MG/DL (ref 8.5–10.4)
CHLORIDE SERPL-SCNC: 107 MMOL/L (ref 97–107)
CO2 SERPL-SCNC: 23 MMOL/L (ref 24–31)
CREAT SERPL-MCNC: 1.1 MG/DL (ref 0.4–1.6)
EGFRCR SERPLBLD CKD-EPI 2021: 58 ML/MIN/1.73M*2
GLUCOSE SERPL-MCNC: 91 MG/DL (ref 65–99)
POTASSIUM SERPL-SCNC: 4.4 MMOL/L (ref 3.4–5.1)
SODIUM SERPL-SCNC: 143 MMOL/L (ref 133–145)

## 2024-03-21 PROCEDURE — 36415 COLL VENOUS BLD VENIPUNCTURE: CPT

## 2024-03-21 PROCEDURE — 80048 BASIC METABOLIC PNL TOTAL CA: CPT

## 2024-03-29 ENCOUNTER — HOSPITAL ENCOUNTER (OUTPATIENT)
Dept: RADIOLOGY | Facility: HOSPITAL | Age: 59
Discharge: HOME | End: 2024-03-29
Payer: COMMERCIAL

## 2024-03-29 DIAGNOSIS — E04.2 MULTINODULAR GOITER: ICD-10-CM

## 2024-03-29 PROCEDURE — 76536 US EXAM OF HEAD AND NECK: CPT

## 2024-03-29 PROCEDURE — 76536 US EXAM OF HEAD AND NECK: CPT | Performed by: RADIOLOGY

## 2024-04-10 ENCOUNTER — OFFICE VISIT (OUTPATIENT)
Dept: PULMONOLOGY | Facility: CLINIC | Age: 59
End: 2024-04-10
Payer: COMMERCIAL

## 2024-04-10 VITALS
SYSTOLIC BLOOD PRESSURE: 111 MMHG | BODY MASS INDEX: 37.62 KG/M2 | WEIGHT: 240.2 LBS | OXYGEN SATURATION: 98 % | HEART RATE: 71 BPM | DIASTOLIC BLOOD PRESSURE: 66 MMHG | RESPIRATION RATE: 16 BRPM | TEMPERATURE: 96.6 F

## 2024-04-10 DIAGNOSIS — R06.09 DOE (DYSPNEA ON EXERTION): Primary | ICD-10-CM

## 2024-04-10 DIAGNOSIS — R06.02 SOB (SHORTNESS OF BREATH) ON EXERTION: ICD-10-CM

## 2024-04-10 PROCEDURE — 1036F TOBACCO NON-USER: CPT | Performed by: INTERNAL MEDICINE

## 2024-04-10 PROCEDURE — 3078F DIAST BP <80 MM HG: CPT | Performed by: INTERNAL MEDICINE

## 2024-04-10 PROCEDURE — 99204 OFFICE O/P NEW MOD 45 MIN: CPT | Performed by: INTERNAL MEDICINE

## 2024-04-10 PROCEDURE — 99214 OFFICE O/P EST MOD 30 MIN: CPT | Performed by: INTERNAL MEDICINE

## 2024-04-10 PROCEDURE — 3074F SYST BP LT 130 MM HG: CPT | Performed by: INTERNAL MEDICINE

## 2024-04-10 ASSESSMENT — ENCOUNTER SYMPTOMS
SINUS PAIN: 0
SPEECH DIFFICULTY: 0
HEMATURIA: 0
DYSURIA: 0
EYE DISCHARGE: 0
RHINORRHEA: 0
CONSTIPATION: 0
SLEEP DISTURBANCE: 0
FREQUENCY: 0
FACIAL SWELLING: 0
AGITATION: 0
WHEEZING: 0
ABDOMINAL DISTENTION: 0
NERVOUS/ANXIOUS: 0
DIZZINESS: 0
EYE REDNESS: 0
ADENOPATHY: 0
NUMBNESS: 0
ARTHRALGIAS: 1
COUGH: 0
BRUISES/BLEEDS EASILY: 0
HEADACHES: 0
TREMORS: 0
ABDOMINAL PAIN: 0
NAUSEA: 0
FEVER: 0
PALPITATIONS: 0
LIGHT-HEADEDNESS: 0
SINUS PRESSURE: 0
JOINT SWELLING: 0
UNEXPECTED WEIGHT CHANGE: 0
APNEA: 0
FATIGUE: 0
STRIDOR: 0
WEAKNESS: 0
SHORTNESS OF BREATH: 1
DIFFICULTY URINATING: 0
BACK PAIN: 1
CHOKING: 0

## 2024-04-10 NOTE — LETTER
Shayy Weeks is a 59 y.o. year old patient referred for pulmonary evaluation.  My summary is attached of his/her current complaints along with their  pertinent past medical history. Also, I have outlined my diagnostic assessment/plan. If you have any questions please feel free to contact me and as always, thank you for the referral.       Eleazar Russ MD, MPH

## 2024-04-10 NOTE — PATIENT INSTRUCTIONS
To further evaluate the shortness of breath will order complete pulmonary function test and a 6-minute walk.  These studies can be scheduled by calling

## 2024-04-10 NOTE — PROGRESS NOTES
@PULMONARY CONSULTATION@         Reason for Consult: NOONAN    ASSESSMENT:   The patient is a 58-year-old with a history of hypertension, hyperlipidemia, obesity, obstructive sleep apnea, a long smoking history and diastolic dysfunction.  She is complaining of increasing shortness of breath for at least 3 to 4 years.  She has gained weight and her echocardiogram reveals normal right-sided pressures.  She has normal LV function and a CT scan recently demonstrated nothing of concern.  Probably her breathing issues revolve around increased weight and poor conditioning being fairly sedentary.  Obviously with her long smoking history further evaluation for COPD needs to be carried out.     PLAN:   To further evaluate the shortness of breath will order complete pulmonary function test and a 6-minute walk.  These studies can be scheduled by calling       HISTORY OF PRESENT ILLNESS:     The patient is a 58-year-old with a history of hypertension, hyperlipidemia, obesity, obstructive sleep apnea and a prior history of tobacco use.  She has dyspnea on exertion and has previously seen cardiology.  Her cardiac score has been 0 and Jardiance and spironolactone were added to her regiment without improvement in her dyspnea.  She is referred for evaluation.  It was felt by cardiology that her shortness of breath probably was multifactorial related to diastolic heart failure, deconditioning and obesity.    The echocardiogram from December 4, 2023 revealed the following     1. Left ventricular systolic function is normal with a 60-65% estimated ejection fraction.   2. The left atrium is moderately dilated.   3. RVSP within normal limits.    A screening CT scan of the chest from December 18, 2023 revealed no pulmonary nodules    The patient reports even before the pandemic she had noted shortness of breath with dyspnea on exertion.  She is more short of breath if she walks fast on level ground.  Category 1 based on the mMRC.   She has a little cough on occasion but nothing regularly.  She has no fevers or chills abdominal complaints nausea vomiting.  She was on Jardiance without improvement.  Her weight is up around 10 or more pounds.  She smoked probably over a span of 40 years with periods of cessation.  Probably around 25 packs years overall.  She stopped during the pandemic and had become a very compulsive  smoker until she stopped.  She was smoking all the time.  There is no history of asthma.  She does have sleep apnea using CPAP for 10 years.  She has no history of GERD.  Her maternal grandfather had emphysema.  She has a cat at home but has no known allergies.  She drinks on occasion and has no history of vaping or other kinds of exposures.  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  [employer] is her major client selling them [employer] and pastry products etc          Allergies   Allergen Reactions    Bee Venom Protein (Honey Bee) Unknown        PAST MEDICAL HISTORY:   history of hypertension, hyperlipidemia, obesity, obstructive sleep apnea and a prior history of tobacco use; also a history of oophorectomy for benign disease along with cervical and lumbar laminectomy.  She also has had carpal tunnel surgery knee replacement.    Current Outpatient Medications:     albuterol 90 mcg/actuation inhaler, INHALE 2 PUFFS BY MOUTH 4 TIMES A DAY FOR 30 DAYS, Disp: , Rfl:     atorvastatin (Lipitor) 10 mg tablet, TAKE 1 TABLET BY MOUTH EVERYDAY AT BEDTIME, Disp: 90 tablet, Rfl: 1    buPROPion XL (Wellbutrin XL) 300 mg 24 hr tablet, TAKE 1 TABLET BY MOUTH EVERY DAY, Disp: 90 tablet, Rfl: 3    citalopram (CeleXA) 40 mg tablet, Take 1 tablet (40 mg) by mouth once daily., Disp: 90 tablet, Rfl: 3    empagliflozin (Jardiance) 10 mg, Take 1 tablet (10 mg) by mouth once daily., Disp: 30 tablet, Rfl: 11    losartan (Cozaar) 50 mg tablet, TAKE 1 TABLET BY MOUTH EVERY DAY, Disp: 90 tablet, Rfl: 3    meloxicam (Mobic) 7.5 mg tablet, Take 1 tablet (7.5 mg) by  mouth 2 times a day as needed., Disp: , Rfl:     spironolactone (Aldactone) 25 mg tablet, Take 1 tablet (25 mg) by mouth once daily., Disp: 90 tablet, Rfl: 3     FAMILY HISTORY:   Maternal grandfather with emphysema  SOCIAL HISTORY:  Probably around 25-30-pack-year history of smoking up until 2020.  EXPOSURE AND WORK HISTORY:  Occasional alcohol exposure without vaping, marijuana etc.  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    Review of Systems   Constitutional:  Negative for fatigue, fever and unexpected weight change.   HENT:  Negative for congestion, facial swelling, nosebleeds, postnasal drip, rhinorrhea, sinus pressure and sinus pain.    Eyes:  Negative for discharge, redness and visual disturbance.   Respiratory:  Positive for shortness of breath. Negative for apnea, cough, choking, wheezing and stridor.    Cardiovascular:  Negative for chest pain, palpitations and leg swelling.   Gastrointestinal:  Negative for abdominal distention, abdominal pain, constipation and nausea.   Endocrine: Negative for cold intolerance and heat intolerance.   Genitourinary:  Negative for difficulty urinating, dysuria, frequency and hematuria.   Musculoskeletal:  Positive for arthralgias and back pain. Negative for gait problem and joint swelling.   Allergic/Immunologic: Negative for environmental allergies, food allergies and immunocompromised state.   Neurological:  Negative for dizziness, tremors, syncope, speech difficulty, weakness, light-headedness, numbness and headaches.   Hematological:  Negative for adenopathy. Does not bruise/bleed easily.   Psychiatric/Behavioral:  Negative for agitation, behavioral problems and sleep disturbance. The patient is not nervous/anxious.         Vitals:    04/10/24 0854   BP: 111/66   Pulse: 71   Resp: 16   Temp: 35.9 °C (96.6 °F)   SpO2: 98%        Physical Exam  Vitals reviewed.   Constitutional:       Appearance: Normal appearance.    HENT:      Head: Normocephalic and atraumatic.   Eyes:      Extraocular Movements: Extraocular movements intact.   Cardiovascular:      Rate and Rhythm: Normal rate and regular rhythm.      Heart sounds: No murmur heard.     No friction rub. No gallop.   Pulmonary:      Effort: Pulmonary effort is normal. No respiratory distress.      Breath sounds: Normal breath sounds. No stridor. No wheezing, rhonchi or rales.   Chest:      Chest wall: No tenderness.   Abdominal:      General: Abdomen is flat. There is no distension.      Palpations: Abdomen is soft. There is no mass.      Tenderness: There is no abdominal tenderness.   Musculoskeletal:         General: Normal range of motion.      Cervical back: Normal range of motion.      Right lower leg: No edema.      Left lower leg: No edema.   Skin:     General: Skin is warm and dry.   Neurological:      Mental Status: She is alert and oriented to person, place, and time.   Psychiatric:         Mood and Affect: Mood normal.         Behavior: Behavior normal.

## 2024-04-26 ENCOUNTER — TRANSCRIBE ORDERS (OUTPATIENT)
Dept: ORTHOPEDIC SURGERY | Facility: HOSPITAL | Age: 59
End: 2024-04-26
Payer: COMMERCIAL

## 2024-04-26 DIAGNOSIS — M54.50 LOW BACK PAIN, UNSPECIFIED BACK PAIN LATERALITY, UNSPECIFIED CHRONICITY, UNSPECIFIED WHETHER SCIATICA PRESENT: ICD-10-CM

## 2024-04-29 ENCOUNTER — APPOINTMENT (OUTPATIENT)
Dept: PRIMARY CARE | Facility: CLINIC | Age: 59
End: 2024-04-29
Payer: COMMERCIAL

## 2024-04-30 ENCOUNTER — HOSPITAL ENCOUNTER (OUTPATIENT)
Dept: RADIOLOGY | Facility: CLINIC | Age: 59
Discharge: HOME | End: 2024-04-30
Payer: COMMERCIAL

## 2024-04-30 ENCOUNTER — OFFICE VISIT (OUTPATIENT)
Dept: ORTHOPEDIC SURGERY | Facility: CLINIC | Age: 59
End: 2024-04-30
Payer: COMMERCIAL

## 2024-04-30 DIAGNOSIS — M51.36 LUMBAR DEGENERATIVE DISC DISEASE: ICD-10-CM

## 2024-04-30 DIAGNOSIS — M47.816 LUMBAR SPONDYLOSIS: Primary | ICD-10-CM

## 2024-04-30 DIAGNOSIS — M47.816 LUMBAR SPONDYLOSIS: ICD-10-CM

## 2024-04-30 PROCEDURE — 72110 X-RAY EXAM L-2 SPINE 4/>VWS: CPT | Performed by: RADIOLOGY

## 2024-04-30 PROCEDURE — 99214 OFFICE O/P EST MOD 30 MIN: CPT | Performed by: PHYSICIAN ASSISTANT

## 2024-04-30 PROCEDURE — 72120 X-RAY BEND ONLY L-S SPINE: CPT

## 2024-04-30 RX ORDER — METHOCARBAMOL 500 MG/1
500 TABLET, FILM COATED ORAL 4 TIMES DAILY PRN
Qty: 40 TABLET | Refills: 0 | Status: SHIPPED | OUTPATIENT
Start: 2024-04-30 | End: 2024-05-16

## 2024-04-30 RX ORDER — MELOXICAM 7.5 MG/1
7.5 TABLET ORAL 2 TIMES DAILY PRN
Qty: 60 TABLET | Refills: 3 | Status: SHIPPED | OUTPATIENT
Start: 2024-04-30

## 2024-04-30 RX ORDER — PREDNISONE 10 MG/1
TABLET ORAL DAILY
Qty: 24 TABLET | Refills: 0 | Status: SHIPPED | OUTPATIENT
Start: 2024-04-30 | End: 2024-05-06

## 2024-05-03 NOTE — PROGRESS NOTES
"HPI:  Shayy Weeks is a 59 y.o. female who presents to the spine clinic for evaluation of low back pain.     She has a history of prior L4-5 laminectomy with in situ fusion in 1998. She did very well after that surgery.   Reports approx 4 months of increased low back pain with difficulty standing up from a seated position. Reports frequent \"popping\" and \"jolting\" pain in the back. Denies radicular pain/numbness/tingling into the lower extremities. Denies lower extremity weakness or leg pain with ambulation.     She has a job that requires her to be seated most of the day. She has been doing water exercises s/p meniscal tear but no recent PT for her low back. No injections.     She takes Meloxicam intermittently with restrictions per her cardiologist.     ROS:  Reviewed on EMR and patient intake sheet.    PMH/SH:  Reviewed on EMR and patient intake sheet.    Exam:  Physical Exam  Spine Musculoskeletal Exam    Well appearing, NAD  Pleasant & cooperative  BMI 37.62  Decreased ROM lumbar spine with rotation, flexion/extension  No paraspinal muscle spasms  No TTP  lower extremity sensation intact  lower extremity motor 5/5 throughout  normal gait & station  Neg SLR    Radiology:     Xrays lumbar spine done in the office today 04/30/24 personally reviewed. L4-5 lami with posterolateral fusion masses. No scoliosis or fractures. No movement with flex/ext. Multilevel disc degeneration and facet arthropathy.    Assessment and Plan:  1. Lumbar spondylosis  2. Lumbar degenerative disc disease  - Referral to Physical Therapy; Future  - meloxicam (Mobic) 7.5 mg tablet; Take 1 tablet (7.5 mg) by mouth 2 times a day as needed for moderate pain (4 - 6).  Dispense: 60 tablet; Refill: 3  - predniSONE (Deltasone) 10 mg tablet; Take 5 tablets (50 mg) by mouth once daily for 2 days, THEN 4 tablets (40 mg) once daily for 2 days, THEN 3 tablets (30 mg) once daily for 1 day, THEN 2 tablets (20 mg) once daily for 1 day, THEN 1 tablet (10 " mg) once daily for 1 day.  Dispense: 24 tablet; Refill: 0  - methocarbamol (Robaxin) 500 mg tablet; Take 1 tablet (500 mg) by mouth 4 times a day as needed for muscle spasms for up to 10 days.  Dispense: 40 tablet; Refill: 0    I reviewed the imaging studies with Shayy Weeks in detail today. Patient with low back pain that does not radiate into the lower extremities. We discussed the pathology and natural course of degenerative disc disease. I educated the patient on several lifestyle modifications that include increased walking, weight management, non-smoking, and a routine exercise plan that includes core strengthening. The patient was educated that this pain may fluctuate over time.     Short term prescription for prednisone and robaxin provided for acute pain. Referral for aqua-therapy provided and I refilled her Meloxicam at her request.    If her pain persists despite the above recommend follow up in the office for further evaluation.    Patient in agreement to plan of care. Of course Shayy Weeks is welcome to call at anytime with questions or concerns.     Abi Teague PA-C  Department of Orthopaedic Surgery    [address] [place] [address]    Voicemail: [phone]   Appts: [phone]  Fax: [phone]

## 2024-05-06 ENCOUNTER — OFFICE VISIT (OUTPATIENT)
Dept: PRIMARY CARE | Facility: CLINIC | Age: 59
End: 2024-05-06
Payer: COMMERCIAL

## 2024-05-06 VITALS
DIASTOLIC BLOOD PRESSURE: 66 MMHG | TEMPERATURE: 96.8 F | HEART RATE: 72 BPM | SYSTOLIC BLOOD PRESSURE: 118 MMHG | WEIGHT: 239.1 LBS | HEIGHT: 67 IN | BODY MASS INDEX: 37.53 KG/M2

## 2024-05-06 DIAGNOSIS — G47.33 OBSTRUCTIVE SLEEP APNEA: ICD-10-CM

## 2024-05-06 DIAGNOSIS — I10 HYPERTENSION, UNSPECIFIED TYPE: ICD-10-CM

## 2024-05-06 DIAGNOSIS — E78.9 LIPID DISORDER: ICD-10-CM

## 2024-05-06 DIAGNOSIS — T78.40XS ALLERGIC DISORDER, SEQUELA: Primary | ICD-10-CM

## 2024-05-06 PROCEDURE — 3078F DIAST BP <80 MM HG: CPT | Performed by: FAMILY MEDICINE

## 2024-05-06 PROCEDURE — 3074F SYST BP LT 130 MM HG: CPT | Performed by: FAMILY MEDICINE

## 2024-05-06 PROCEDURE — 99214 OFFICE O/P EST MOD 30 MIN: CPT | Performed by: FAMILY MEDICINE

## 2024-05-06 PROCEDURE — 1036F TOBACCO NON-USER: CPT | Performed by: FAMILY MEDICINE

## 2024-05-06 RX ORDER — AZELASTINE 1 MG/ML
2 SPRAY, METERED NASAL 2 TIMES DAILY
Qty: 30 ML | Refills: 12 | Status: SHIPPED | OUTPATIENT
Start: 2024-05-06 | End: 2025-05-06

## 2024-05-06 RX ORDER — FLUTICASONE PROPIONATE 50 MCG
2 SPRAY, SUSPENSION (ML) NASAL 2 TIMES DAILY
Qty: 16 G | Refills: 11 | Status: SHIPPED | OUTPATIENT
Start: 2024-05-06 | End: 2025-05-06

## 2024-05-06 RX ORDER — METHYLPREDNISOLONE 4 MG/1
TABLET ORAL
Qty: 21 TABLET | Refills: 0 | Status: SHIPPED | OUTPATIENT
Start: 2024-05-06 | End: 2024-05-16 | Stop reason: ALTCHOICE

## 2024-05-06 ASSESSMENT — ENCOUNTER SYMPTOMS
DEPRESSION: 0
OCCASIONAL FEELINGS OF UNSTEADINESS: 0
LOSS OF SENSATION IN FEET: 0

## 2024-05-06 NOTE — PROGRESS NOTES
This is a 59-year-old female patient who is here for follow-up    She has been experiencing shortness of breath throat irritation and rhinorrhea    She was seen by cardiologist for shortness of breath on exertion investigations did not show any issues suggesting she has congestive heart failure but the cardiologist had started her on Jardiance and spironolactone    Also she is in the midst saying the pulmonologist and workup pulmonary issues for the same complaint    She also has been suffering with backache for which she was given to her to take 5 tablets the first day and a tapering dose and that to be used on a as needed basis along with meloxicam    She will also start on aerobics water aerobics and land aerobics under the guidance of the orthopedic    On exam her vital signs normal BMI is still quite high and I do not see any change in that    She wants to come off of Wellbutrin which she was advised to taper down    She says she needs to take less medicine and since she has stopped smoking she feels she does not need to be on Wellbutrin    She has been wearing her CPAP machine diligently daily    I explained to her the reason for the cough and it is purely due to high pollen in the environment therefore I started on Azelastine Nasal spray and Flonase and also gave her a Medrol Dosepak and albuterol to be used on a as needed basis and I advised her that she can go ahead and schedule her pulmonary function test (she has canceled appointment due to having sinus congestion and cough issues)    She sees the gynecologist and her mammogram will be ordered by her gynecologist    I advised her to come back in October for her annual physical    Her colonoscopy was done in 2021 and next will be in 2026

## 2024-05-08 ENCOUNTER — APPOINTMENT (OUTPATIENT)
Dept: RESPIRATORY THERAPY | Facility: HOSPITAL | Age: 59
End: 2024-05-08
Payer: COMMERCIAL

## 2024-05-16 ENCOUNTER — OFFICE VISIT (OUTPATIENT)
Dept: CARDIOLOGY | Facility: HOSPITAL | Age: 59
End: 2024-05-16
Payer: COMMERCIAL

## 2024-05-16 VITALS
SYSTOLIC BLOOD PRESSURE: 100 MMHG | BODY MASS INDEX: 36.41 KG/M2 | OXYGEN SATURATION: 96 % | HEIGHT: 67 IN | DIASTOLIC BLOOD PRESSURE: 50 MMHG | WEIGHT: 232 LBS | HEART RATE: 82 BPM

## 2024-05-16 DIAGNOSIS — I10 PRIMARY HYPERTENSION: Primary | ICD-10-CM

## 2024-05-16 DIAGNOSIS — R06.02 SOB (SHORTNESS OF BREATH) ON EXERTION: ICD-10-CM

## 2024-05-16 DIAGNOSIS — E78.5 DYSLIPIDEMIA: ICD-10-CM

## 2024-05-16 PROCEDURE — 3074F SYST BP LT 130 MM HG: CPT | Performed by: NURSE PRACTITIONER

## 2024-05-16 PROCEDURE — 99214 OFFICE O/P EST MOD 30 MIN: CPT | Performed by: NURSE PRACTITIONER

## 2024-05-16 PROCEDURE — 3078F DIAST BP <80 MM HG: CPT | Performed by: NURSE PRACTITIONER

## 2024-05-16 ASSESSMENT — ENCOUNTER SYMPTOMS
OCCASIONAL FEELINGS OF UNSTEADINESS: 0
DEPRESSION: 0
LOSS OF SENSATION IN FEET: 0

## 2024-05-16 NOTE — PROGRESS NOTES
Primary Care Physician: Aubrie Dewey MD  Date of Visit: 05/16/2024  9:20 AM EDT  Location of visit: [facility]     Chief Complaint:   Chief Complaint   Patient presents with    Follow-up    NOONAN        HPI / Summary:   Shayy Weeks is a 59 y.o. female presents for followup. Seen in collaboration with Dr. Ames. She continues to have dyspnea on exertion walking briskly prolonged distances that is unchanged. No chest pain. She is no longer swimming due to back pain. She has been intentionally losing weight. The patient denies chest pain, palpitations, lightheadedness, syncope, orthopnea, paroxysmal nocturnal dyspnea, lower extremity edema, or bleeding problems.              Past Medical History:  Past Medical History:   Diagnosis Date    Atypical squamous cells of undetermined significance on cytologic smear of cervix (ASC-US) 08/26/2016    ASCUS with positive high risk HPV cervical    Carpal tunnel syndrome, right upper limb 10/29/2017    Carpal tunnel syndrome of right wrist    Cervical high risk human papillomavirus (HPV) DNA test positive 06/01/2015    Cervical high risk HPV (human papillomavirus) test positive    Chronic obstructive pulmonary disease with (acute) exacerbation (Multi) 09/21/2016    COPD exacerbation    Noninfective gastroenteritis and colitis, unspecified 12/29/2016    Acute gastroenteritis    Nontoxic single thyroid nodule 09/28/2020    Solitary thyroid nodule    Osteoarthritis of knee, unspecified 04/05/2022    Osteoarthritis of knee    Other abnormal cytological findings on specimens from cervix uteri 12/19/2014    Unexplained endometrial cells on cervical Pap smear    Other muscle spasm 03/31/2016    Cervical paraspinous muscle spasm    Other muscle spasm 03/31/2016    Trapezius muscle spasm    Pain in right hand 09/07/2017    Pain of right hand    Personal history of colonic polyps 07/27/2016    History of adenomatous polyp of colon    Personal history of other diseases of  the respiratory system 06/25/2014    History of acute pharyngitis    Personal history of other diseases of the respiratory system 03/17/2014    History of acute bronchitis    Personal history of other diseases of the respiratory system 03/17/2014    History of acute sinusitis    Personal history of other endocrine, nutritional and metabolic disease 07/19/2022    History of diabetes mellitus    Personal history of other specified conditions 09/21/2016    History of wheezing    Personal history of other specified conditions 12/28/2020    History of weight gain    Personal history of other specified conditions 08/02/2013    History of abdominal pain    Radiculopathy, cervical region 03/31/2016    Cervical radiculopathy, acute    Unspecified acute lower respiratory infection 06/29/2017    Acute lower respiratory infection        Past Surgical History:  Past Surgical History:   Procedure Laterality Date    CERVICAL FUSION  08/02/2013    Cervical Vertebral Fusion    LUMBAR LAMINECTOMY  08/02/2013    Laminectomy Lumbar    OTHER SURGICAL HISTORY  08/02/2013    Oophorectomy - Bilateral (Removal Of Both Ovaries)          Social History:   reports that she has quit smoking. Her smoking use included cigarettes. She has a 10 pack-year smoking history. She has quit using smokeless tobacco. She reports current alcohol use of about 1.0 standard drink of alcohol per week. She reports that she does not use drugs.     Family History:  family history includes CARDIAC DISORDER in her father; Frontotemporal dementia in her mother.      Allergies:  No Active Allergies      Outpatient Medications:  Current Outpatient Medications   Medication Instructions    albuterol 90 mcg/actuation inhaler INHALE 2 PUFFS BY MOUTH 4 TIMES A DAY FOR 30 DAYS    atorvastatin (Lipitor) 10 mg tablet TAKE 1 TABLET BY MOUTH EVERYDAY AT BEDTIME    azelastine (Astelin) 137 mcg (0.1 %) nasal spray 2 sprays, Each Nostril, 2 times daily, Use in each nostril as  "directed    buPROPion XL (WELLBUTRIN XL) 300 mg, oral, Daily    citalopram (CELEXA) 40 mg, oral, Daily    empagliflozin (JARDIANCE) 10 mg, oral, Daily    fluticasone (Flonase) 50 mcg/actuation nasal spray 2 sprays, Each Nostril, 2 times daily, Shake gently. Before first use, prime pump. After use, clean tip and replace cap.    losartan (COZAAR) 50 mg, oral, Daily    meloxicam (MOBIC) 7.5 mg, oral, 2 times daily PRN    methocarbamol (ROBAXIN) 500 mg, oral, 4 times daily PRN    spironolactone (ALDACTONE) 25 mg, oral, Daily       Physical Exam:  Vitals:    05/16/24 0916   BP: 142/80   BP Location: Left arm   Patient Position: Sitting   BP Cuff Size: Adult   Pulse: 82   SpO2: 96%   Weight: 105 kg (232 lb)   Height: 1.702 m (5' 7\")     Wt Readings from Last 5 Encounters:   05/16/24 105 kg (232 lb)   05/06/24 108 kg (239 lb 1.6 oz)   04/10/24 109 kg (240 lb 3.2 oz)   03/05/24 109 kg (240 lb 9.6 oz)   02/15/24 109 kg (240 lb)     Body mass index is 36.34 kg/m².     GENERAL: alert, cooperative, pleasant, in no acute distress  SKIN: warm and dry  NECK: Normal JVD, negative HJR  CARDIAC: Regular rate and rhythm with 2/6 early peaking systolic murmur, no rubs or gallops  CHEST: Normal respiratory efforts, lungs clear to auscultation bilaterally.  ABDOMEN: soft, nontender, nondistended  EXTREMITIES: no edema, +2 palpable RP bilaterally       Last Labs:  Recent Labs     10/24/23  0854 10/27/22  1341 12/03/21  0946   WBC 7.1 7.5 6.5   HGB 13.3 13.4 12.8   HCT 41.1 40.5 40.4    276 241   MCV 97 96 97     Recent Labs     03/21/24  1207 12/18/23  0833 11/22/23  1030    139 142   K 4.4 4.8 4.5    107 107   BUN 14 22 22   CREATININE 1.10 1.00 1.10     CMP -  Lab Results   Component Value Date    CALCIUM 9.7 03/21/2024    PROT 7.0 10/24/2023    ALBUMIN 4.5 10/24/2023    AST 13 10/24/2023    ALT 14 10/24/2023    ALKPHOS 100 10/24/2023    BILITOT 0.4 10/24/2023       LIPID PANEL -   Lab Results   Component Value Date " "   CHOL 126 (L) 11/22/2023    HDL 39.0 (L) 11/22/2023    LDLF 84 04/04/2023    TRIG 133 11/22/2023       No results found for: \"BNP\", \"HGBA1C\"    Last Cardiology Tests:  ECG:  Reviewed EKG from 11/14/23- normal sinus rhythm HR 70    Echo:  Echo Results:  Transthoracic Echo (TTE) Complete 12/04/2023    [facility], [address] [place] [address]  Tel [phone] and Fax [phone]    TRANSTHORACIC ECHOCARDIOGRAM REPORT      Patient Name:     KLARISSA PEMBERTON ANGIE      Reading Physician:  [id number] Jamaal Ames MD  Study Date:       12/4/2023           Ordering Provider:  [id number] JAMAAL AMES  MRN/PID:          [id number]            Fellow:  Accession#:       [id number]        Nurse:  Date of           1965 / [birth year]      Sonographer:        Lamonte Mcdermott RDMS  Birth/Age:        years  Gender:           F                   Additional Staff:   Elda Hartman RD  Height:           170.00 cm           Admit Date:  Weight:           114.00 kg           Admission Status:   Outpatient  BSA:              2.23 m2             Encounter#:         [id number]  [facility]  Location:           Invasive  Blood Pressure: 125 /78 mmHg    Study Type:    TRANSTHORACIC ECHO (TTE) COMPLETE  Diagnosis/ICD: Shortness of breath-R06.02  Indication:    SOB  CPT Code:      Echo Complete w Full Doppler-89107    Patient History:  Pertinent History: Cardiomyopathy and HTN.    Study Detail: The following Echo studies were performed: 2D, M-Mode, Doppler and  color flow.      PHYSICIAN INTERPRETATION:  Left Ventricle: The left ventricular systolic function is normal, with an estimated ejection fraction of 60-65%. There are no regional wall motion abnormalities. The left ventricular cavity size is normal. Spectral Doppler shows a normal pattern of left ventricular diastolic filling.  Left Atrium: The left atrium is moderately dilated.  Right Ventricle: The right ventricle is normal in size. There is normal " right ventricular global systolic function.  Right Atrium: The right atrium is normal in size.  Aortic Valve: The aortic valve is trileaflet. There is trivial aortic valve regurgitation. The peak instantaneous gradient of the aortic valve is 14.4 mmHg. The mean gradient of the aortic valve is 6.9 mmHg.  Mitral Valve: The mitral valve is normal in structure. There is mild mitral valve regurgitation.  Tricuspid Valve: The tricuspid valve is structurally normal. There is mild tricuspid regurgitation. The Doppler estimated RVSP is within normal limits at 24.7 mmHg.  Pulmonic Valve: The pulmonic valve is structurally normal. There is no indication of pulmonic valve regurgitation.  Pericardium: There is no pericardial effusion noted.  Aorta: The aortic root is normal.  In comparison to the previous echocardiogram(s): Compared with study from 11/23/2021, no significant change.      CONCLUSIONS:  1. Left ventricular systolic function is normal with a 60-65% estimated ejection fraction.  2. The left atrium is moderately dilated.  3. RVSP within normal limits.    QUANTITATIVE DATA SUMMARY:  2D MEASUREMENTS:  Normal Ranges:  IVSd:          1.06 cm   (0.6-1.1cm)  LVPWd:         0.96 cm   (0.6-1.1cm)  LVIDd:         4.82 cm   (3.9-5.9cm)  LVIDs:         3.20 cm  LV Mass Index: 78.1 g/m2  LV % FS        33.6 %    LA VOLUME:  Normal Ranges:  LA Vol A4C:        103.9 ml   (22+/-6mL/m2)  LA Vol A2C:        99.1 ml  LA Vol BP:         106.7 ml  LA Vol Index A4C:  46.7 ml/m2  LA Vol Index A2C:  44.5 ml/m2  LA Vol Index BP:   47.9 ml/m2  LA Area A4C:       26.4 cm2  LA Area A2C:       27.1 cm2  LA Major Axis A4C: 5.7 cm  LA Major Axis A2C: 6.3 cm  LA Volume Index:   47.9 ml/m2  LA Vol A4C:        91.7 ml  LA Vol A2C:        94.0 ml    RA VOLUME BY A/L METHOD:  Normal Ranges:  RA Vol A4C:        63.1 ml    (8.3-19.5ml)  RA Vol Index A4C:  28.3 ml/m2  RA Area A4C:       20.2 cm2  RA Major Axis A4C: 5.5 cm    AORTA MEASUREMENTS:  Normal  Ranges:  Ao Sinus, d: 3.00 cm (2.1-3.5cm)  Ao STJ, d:   2.60 cm (1.7-3.4cm)  Asc Ao, d:   3.10 cm (2.1-3.4cm)    LV SYSTOLIC FUNCTION BY 2D PLANIMETRY (MOD):  Normal Ranges:  EF-A4C View: 65.1 % (>=55%)  EF-A2C View: 62.4 %  EF-Biplane:  63.5 %    LV DIASTOLIC FUNCTION:  Normal Ranges:  MV Peak E:        0.83 m/s    (0.7-1.2 m/s)  MV Peak A:        0.97 m/s    (0.42-0.7 m/s)  E/A Ratio:        0.86        (1.0-2.2)  MV e'             0.10 m/s    (>8.0)  MV lateral e'     0.10 m/s  MV medial e'      0.06 m/s  MV A Dur:         200.69 msec  E/e' Ratio:       8.31        (<8.0)  PulmV Sys Davian:    59.44 cm/s  PulmV Contreras Davian:   26.23 cm/s  PulmV S/D Davian:    2.27  PulmV A Revs Davian: 29.12 cm/s  PulmV A Revs Dur: 113.03 msec    MITRAL VALVE:  Normal Ranges:  MV DT: 308 msec (150-240msec)    AORTIC VALVE:  Normal Ranges:  AoV Vmax:      1.90 m/s  (<=1.7m/s)  AoV Peak P.4 mmHg (<20mmHg)  AoV Mean P.9 mmHg  (1.7-11.5mmHg)  AoV VTI:       40.98 cm  (18-25cm)  LVOT Diameter: 2.10 cm   (1.8-2.4cm)      RIGHT VENTRICLE:  RV Basal 4.30 cm  RV Mid   3.40 cm  RV Major 6.4 cm  TAPSE:   28.0 mm  RV s'    0.13 m/s    TRICUSPID VALVE/RVSP:  Normal Ranges:  Peak TR Velocity: 2.33 m/s  Est. RA Pressure: 3 mmHg  RV Syst Pressure: 24.7 mmHg (< 30mmHg)  IVC Diam:         1.20 cm    PULMONIC VALVE:  Normal Ranges:  PV Max Davian: 1.1 m/s  (0.6-0.9m/s)  PV Max P.4 mmHg    Pulmonary Veins:  PulmV A Revs Dur: 113.03 msec  PulmV A Revs Davian: 29.12 cm/s  PulmV Contreras Davian:   26.23 cm/s  PulmV S/D Davian:    2.27  PulmV Sys Davian:    59.44 cm/s      [id number] Jamaal Ames MD  Electronically signed on 2023 at 8:42:46 AM        ** Final **               Assessment/Plan   Shayy was seen today for follow-up and mercado.  Diagnoses and all orders for this visit:  Primary hypertension (Primary)  -     Basic metabolic panel; Future  Dyslipidemia  SOB (shortness of breath) on exertion        In summary Ms. Weeks is a pleasant 59 year-old white female  with a past medical history significant for hypertension, hyperlipidemia with a CT calcium score of 0 in 2019, obesity, obstructive sleep apnea, and prior tobacco use. Her dyspnea on exertion walking briskly prolonged distances remains unchanged despite adding Jardiance and Spironolactone. She appears euvolemic by clinical exam today. She is going to reschedule testing ordered by pulmonary medicine Dr. Russ. Her blood pressure is on the lower side of normal today. Prior blood pressure readings are better. I did not adjust medications as she is asymptomatic. I did suggest she monitor blood pressure at home.  I suspect her dyspnea on exertion is multifactorial due to diastolic heart failure, deconditioning, and obesity. Her recent echocardiogram showed normal LV function without significant valve abnormalities. I have encouraged her to continue weight loss efforts and increase physical activity. She will continue current cardiovascular medications. We will see her back in follow-up as scheduled with Dr. Ames.       Orders:  No orders of the defined types were placed in this encounter.     Followup Appts:  Future Appointments   Date Time Provider Department Center   5/24/2024  8:15 AM Lan Taveras, PT [id number] [facility]   10/28/2024 10:00 AM Aubrie Dewey MD [id number] [facility]   11/6/2024  1:00 PM Jamaal Ames MD [id number] [facility]           ____________________________________________________________  Lucia Anderson, APRN-CNP  [facility]  [facility]

## 2024-05-16 NOTE — PATIENT INSTRUCTIONS
Continue current cardiovascular medications  Check blood work BMP at end of June  Continue physical activity and weight loss  Monitor blood pressure 1-2 times a week at home. Goal for blood pressure is less than 130/80  Follow up in November with Dr. Kwaku Zee testing for Dr. Russ

## 2024-05-24 ENCOUNTER — EVALUATION (OUTPATIENT)
Dept: PHYSICAL THERAPY | Facility: CLINIC | Age: 59
End: 2024-05-24
Payer: COMMERCIAL

## 2024-05-24 DIAGNOSIS — M51.36 LUMBAR DEGENERATIVE DISC DISEASE: ICD-10-CM

## 2024-05-24 DIAGNOSIS — M47.816 LUMBAR SPONDYLOSIS: ICD-10-CM

## 2024-05-24 PROBLEM — M51.369 LUMBAR DEGENERATIVE DISC DISEASE: Status: ACTIVE | Noted: 2024-05-24

## 2024-05-24 PROCEDURE — 97110 THERAPEUTIC EXERCISES: CPT | Mod: GP

## 2024-05-24 PROCEDURE — 97161 PT EVAL LOW COMPLEX 20 MIN: CPT | Mod: GP

## 2024-05-24 ASSESSMENT — PAIN SCALES - GENERAL: PAINLEVEL_OUTOF10: 0 - NO PAIN

## 2024-05-24 ASSESSMENT — PAIN - FUNCTIONAL ASSESSMENT: PAIN_FUNCTIONAL_ASSESSMENT: 0-10

## 2024-05-24 NOTE — PROGRESS NOTES
"    Physical Therapy Evaluation    Patient Name: Shayy Weeks  MRN: [id number]  Evaluation Date: 5/24/2024  Time Calculation  Start Time: 0814  Stop Time: 0855  Time Calculation (min): 41 min  PT Evaluation Time Entry  PT Evaluation (Low) Time Entry: 27     PT Therapeutic Procedures Time Entry  Therapeutic Exercise Time Entry: 14    Problem List Items Addressed This Visit             ICD-10-CM    Lumbar spondylosis M47.816    Relevant Orders    Follow Up In Physical Therapy    Lumbar degenerative disc disease M51.36    Relevant Orders    Follow Up In Physical Therapy         Subjective   General:  General  Reason for Referral: LBP  Referred By: Abi Teague PA-C    Patient reported hx of condition: Pt reports chronic hx of LBP, including lumbar fusion 1998. Her back had been fine after surgery other than intermittent small flare ups that would not last long. Over the past 6 weeks pain started to get \"zapping\" pains in the middle of her back without specific cause. Pain at rest also increased. She went to see ortho, had x-ray which showed some degeneration. PT ordered with request of aquatics.     Precautions:  Precautions  Precautions Comment: Hx L4-5 fusion    Relevant PMH:  L4-5 fusion, cervical fusion, heart disease, HTN    Red Flags: Do you have any of the following? No  Fever/chills, unexplained weight changes, dizziness/fainting, unexplained change in bowel or bladder functions, unexplained malaise or muscle weakness, night pain/sweats, numbness or tingling    Pain:  Pain Assessment: 0-10  Pain Score: 0 - No pain (Pain at max 10/10 (not frequent))  Pain Type: Chronic pain  Pain Location: Back  Pain Orientation: Lower  Pain Radiating Towards: intermittently radiates into legs  Effect of Pain on Daily Activities: Pain limits prolonged sitting, standing or walking limited to 15 minutes before pain increase, some pain with bending, avoids lifting, pain present with house/yard work    Home Living:  Home type: " House  Stairs: Yes  Lives with: Family  Occupation: full time job doing food sales  Work tasks: desk work  Hobbies/activities: walking, swimming, gardening    Prior Function Per Pt/Caregiver Report:  Prior Function Comments: Functionally independent with intermittent pain until 2 months ago    Imaging:  IMPRESSION:  L4-5 laminectomy with posterolateral fusion masses grossly unchanged  from prior study.      Degenerative changes of the upper lumbar spine noted particularly  T12-L4, progressed from prior study.    OBJECTIVE:  Objective   Posture:  Posture Comment: Mild forward head, rounded shoulders  Range of Motion:  Lumbar ROM Range   Flexion 25% limited, tight   Extension 25% limited, tight   R rotation 25% limited   L rotation 25% limited   R sidebend 25% limited   L sidebend 50% limited, painful     Strength:  LE MMT L R   Hip flexion 4+/5 4+/5   Hip extension 4+/5 4+/5   Hip abduction 4/5 4/5   Hip adduction 4+/5 4+/5   Knee flexion 5/5 5/5   Knee extension 5/5 5/5     Flexibility:  Flexibility Comment: Tight B piriformis, hamstrings, gastrocs  Palpation:  Palpation Comment: Mild TTP B L3-5 paraspinals, PSIS  Special Tests:  Special Tests Comment: Negative slump, SLR, repeated movements  Gait:  Gait Comment: No significant deviations in session - back pain, hip pain present with 10-15 minutes of walking  Stairs:  Stairs Comment: Performs reciprocally with intermittent pain  Bed Mobility:  Bed Mobility Comment: Independent  Transfers:  Transfers Comment: Independent    Outcome Measures:  Other Measures  5x Sit to Stand: 13.9s  Oswestry Disablity Index (CHARLINE): 46%     Assessment  PT Assessment Results: Decreased range of motion, Decreased strength, Decreased mobility, Pain  Rehab Prognosis: Good    Pt is a 59 y.o. female who presents with impairments listed above. These impairments have led to functional limitations including pain with ADLs and limited tolerance of standing, walking, stairs, lifting, and household  tasks. Pt would benefit from skilled physical therapy intervention to improve above impairments and facilitate return to function.    Plan  Treatment/Interventions: Aquatic therapy, Electrical stimulation, Manual therapy, Neuromuscular re-education, Therapeutic activities, Taping techniques, Therapeutic exercises, Ultrasound  PT Plan: Skilled PT  PT Frequency: 2 times per week  Duration: 10 visits (Plan to start with aquatic intervention, transition to land as symptoms decrease and function improves)  Certification Period Start Date: 05/24/24  Certification Period End Date: 08/22/24  Number of Treatments Authorized: 25 then auth  Rehab Potential: Good  Plan of Care Agreement: Patient    Plan for next visit: initiate aquatics to address pain, mobility, core stability, flexibility    OP EDUCATION:  Outpatient Education  Individual(s) Educated: Patient  Education Provided: Anatomy, Body Mechanics, Home Exercise Program, POC  Diagnosis and Precautions: LBP, decreased functional mobility  Risk and Benefits Discussed with Patient/Caregiver/Other: yes  Patient/Caregiver Demonstrated Understanding: yes  Plan of Care Discussed and Agreed Upon: yes  Patient Response to Education: Patient/Caregiver Verbalized Understanding of Information, Patient/Caregiver Performed Return Demonstration of Exercises/Activities, Patient/Caregiver Asked Appropriate Questions    Today's Treatment:  Therapeutic Exercise  HEP issued, demonstrated, instructed pt in exercises, exercises include:  Access Code: [email or web address]  URL: [email or web address]  Date: 05/24/2024  Prepared by: Lan Taveras    Exercises  - Supine Posterior Pelvic Tilt  - 1 x daily - 7 x weekly - 2 sets - 10 reps - 3 second hold  - Supine Hip Adduction Isometric with Ball  - 1 x daily - 7 x weekly - 2 sets - 10 reps - 3 hold  - Hooklying Clamshell with Resistance  - 1 x daily - 7 x weekly - 2 sets - 10 reps  - Supine Lower Trunk Rotation  - 1 x daily - 7 x weekly - 2 sets - 10  reps  - Seated Hamstring Stretch  - 1 x daily - 7 x weekly - 1 sets - 3 reps - 30 second hold    Goals:  Active       PT Problem       STG, 5 visits:       Start:  05/24/24    Expected End:  07/08/24       Pt will be independent in HEP to improve LE and core strength, mobility, and function.  Pt will report 50% reduction in pain with functional mobility such as standing, walking, and stairs.         Pt will increase strength in core and LEs by 1/2 MMT in all planes for improved performance of functional mobility.        Start:  05/24/24    Expected End:  08/22/24            Pt will demonstrate full lumbar AROM without pain in all planes for improved performance of ADLs.        Start:  05/24/24    Expected End:  08/22/24            Pt will ambulate long community distances across all surfaces without deviation and without pain.       Start:  05/24/24    Expected End:  08/22/24            Pt will ascend/descend 2 flights of stairs reciprocally without deviation and without pain for improved household and community mobility.       Start:  05/24/24    Expected End:  08/22/24            Pt will perform 5x sit to  10s without pain for improved functional strength and mobility.       Start:  05/24/24    Expected End:  08/22/24

## 2024-05-26 DIAGNOSIS — I10 HYPERTENSION, UNSPECIFIED TYPE: ICD-10-CM

## 2024-05-28 RX ORDER — ATORVASTATIN CALCIUM 10 MG/1
TABLET, FILM COATED ORAL
Qty: 90 TABLET | Refills: 1 | Status: SHIPPED | OUTPATIENT
Start: 2024-05-28

## 2024-06-04 NOTE — PROGRESS NOTES
Physical Therapy Treatment    Patient Name: Shayy Weeks  MRN: [id number]  Encounter date:  6/5/2024  Time Calculation  Start Time:   Stop Time:   Time Calculation (min): 41 min  PT Therapeutic Procedures Time Entry  Aquatic Therapy:     Visit Number:  2 (including evaluation)  Planned total visits: 10  Visit Authorized: Eduardo Mullen 24 visits          Progress Note at visit #10    Current Problem  Problem List Items Addressed This Visit    None      Surgery  lumbar fusion 1998     Precautions  Hx L4-5 fusion     Pain      Subjective  General    Objective    Treatment:  Aquatic Therapy:   Warm up: 2 laps across pool forward walking    Standing at rail, each leg:   -Heel Raises x 15  -Toe raises x 15  -Hip abduction/side kicks x 15  -Hip Ext/kick backs x 15  -SLR/Hip flexion/ forward kicks x 15  -Hamstring curls x 15   -Seated leg kicks x 15    Patient then moved to the 7' depth.  Completed:  - Hang with pool noodle to unweight LE and trunk x 5 minutes  - Hip abduction/side kicks 2 minutes  - Hang 2 minutes  - Bicycles x 2 minutes.  - Hang x 2 minutes.  - Scissor Kicking/forward-backward x 2 minutes.  - Dangle x 5 minutes.     Forward Walk 2 laps across pool to cool down  Standing hamstring stretch at pool steps, 2 x 20 seconds each leg    Current HEP:  HEP issued, demonstrated, instructed pt in exercises, exercises include:  Access Code: [email or web address]  URL: [email or web address]  Date: 05/24/2024  Prepared by: Lan Taveras     Exercises  - Supine Posterior Pelvic Tilt  - 1 x daily - 7 x weekly - 2 sets - 10 reps - 3 second hold  - Supine Hip Adduction Isometric with Ball  - 1 x daily - 7 x weekly - 2 sets - 10 reps - 3 hold  - Hooklying Clamshell with Resistance  - 1 x daily - 7 x weekly - 2 sets - 10 reps  - Supine Lower Trunk Rotation  - 1 x daily - 7 x weekly - 2 sets - 10 reps  - Seated Hamstring Stretch  - 1 x daily - 7 x weekly - 1 sets - 3 reps - 30 second hold       Activity  tolerance:  {Activity:63930}    OP EDUCATION:       Assessment:     Pt's response to treatment:  ***  Areas of improvements:  ***  Limitations/deficits:  ***    Pain end of session: ***    Plan:     {BASPLAN:98151}    Assessment of current progress against goals:  {BASPTNOTEGOALASSESSMENT:30056}    Goals:  Active         PT Problem         STG, 5 visits:         Start:  05/24/24    Expected End:  07/08/24        Pt will be independent in HEP to improve LE and core strength, mobility, and function.  Pt will report 50% reduction in pain with functional mobility such as standing, walking, and stairs.           Pt will increase strength in core and LEs by 1/2 MMT in all planes for improved performance of functional mobility.          Start:  05/24/24    Expected End:  08/22/24              Pt will demonstrate full lumbar AROM without pain in all planes for improved performance of ADLs.          Start:  05/24/24    Expected End:  08/22/24              Pt will ambulate long community distances across all surfaces without deviation and without pain.         Start:  05/24/24    Expected End:  08/22/24              Pt will ascend/descend 2 flights of stairs reciprocally without deviation and without pain for improved household and community mobility.         Start:  05/24/24    Expected End:  08/22/24              Pt will perform 5x sit to  10s without pain for improved functional strength and mobility.         Start:  05/24/24    Expected End:  08/22/24

## 2024-06-05 ENCOUNTER — APPOINTMENT (OUTPATIENT)
Dept: PHYSICAL THERAPY | Facility: CLINIC | Age: 59
End: 2024-06-05
Payer: COMMERCIAL

## 2024-06-05 ENCOUNTER — DOCUMENTATION (OUTPATIENT)
Dept: PHYSICAL THERAPY | Facility: CLINIC | Age: 59
End: 2024-06-05
Payer: COMMERCIAL

## 2024-06-05 NOTE — PROGRESS NOTES
Physical Therapy                 Therapy Communication Note    Patient Name: Shayy Weeks  MRN: [id number]  Today's Date: 6/5/2024     Discipline: Physical Therapy    Missed Visit Reason: Patient called to cancel their appointment due to scheduling                                     conflicts    Missed Time: Cancelled     Comment: Cancellation #1

## 2024-06-10 NOTE — PROGRESS NOTES
Physical Therapy Treatment    Patient Name: Shayy Weeks  MRN: [id number]  Encounter date:  6/12/2024  Time Calculation  Start Time: 0845  Stop Time: 0929  Time Calculation (min): 44 min  PT Therapeutic Procedures Time Entry  Aquatic Exercise Time Entry: 44    Current Problem  Problem List Items Addressed This Visit             ICD-10-CM    Lumbar spondylosis M47.816    Lumbar degenerative disc disease M51.36       Surgery  Hx L4-5 fusion    Surgery date: 1998    Precautions  Hx L4-5 fusion     Relevant PMH:  L4-5 fusion, cervical fusion, heart disease, HTN    Pain  6/10     Subjective  General      Patient states she has not been doing much, noting she is getting ready for a graduation and preparing another family member for a surgery for their injury. 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 Patient relays she is aware she should get up to walk throughout the day, but admits being inconsistent about doing that, noting laying in prone can reduce her low back pain when it increases. Patient notes increased R knee pain from a prior injury, stating she is using the current therapy course to strengthen that as well.     Objective       Good balance with standing therex and core control with floating therex at rest. Support on rails alternated between BUE and SAMANTHA during floating therex.     Treatment:  Aquatic Therapy:   Patient entered pool with reciprocal gait, BUE support on rails    4' section    Across pool walks down and back x 5  (note time for future sessions)   Standing at rail, each leg: Green water weights  -Heel Raises x 15  -Toe raises x 15  -Hip abduction/side kicks x 15 ea  -Hip Ext/kick backs x 15 ea  -SLR/Hip flexion/ forward kicks x 15  -Hamstring curls x 15 ea  -Seated LAQ x 15 ea    Patient then moved to the 7' depth.  Completed:  - Hang with pool noodle to unweight LE and trunk x 5 minutes  - Hip abduction/side kicks 2 minutes  - Hang  "with pool noodle to unweight LE and trunk  - Bicycles x 2 minutes.  - Hang with pool noodle to unweight LE and trunk  - Scissor Kicking/forward-backward x 2 minutes.  - Hang with pool noodle to unweight LE and trunk x 2 minutes     Re-acclimate to gravity: 1 lap across pool, fwd, bkwd, side to side  Standing hamstring stretch at pool steps, 2 x 30\" each L/R  Standing gastroc stretch at pool steps, 2 x 30\" ea L/R    Patient exited pool with reciprocal gait BUE support on rails    Current HEP:    Activity tolerance:  Good    OP EDUCATION:    Assessment:        Good response to floating therex, reported sensation of low back and lower extremities stretching out at rest during floating therex. No change in R knee pain reported at start of the visit. Verbal cueing provided to improve retro leaning and reach back with LE to walk backwards across pool during warmup laps.    Pt's response to treatment:  Good  Areas of improvements:  Decreased low back pain following floating therex  Limitations/deficits: Increased low back pain with prolonged sitting while working    Pain end of session: 2/10     Plan:    Continue with current POC/no changes    Assessment of current progress against goals:  Progressing toward functional goals and Symptomatic relief with treatment    Goals:    Active         PT Problem         STG, 5 visits:         Start:  05/24/24    Expected End:  07/08/24        Pt will be independent in HEP to improve LE and core strength, mobility, and function.  Pt will report 50% reduction in pain with functional mobility such as standing, walking, and stairs.           Pt will increase strength in core and LEs by 1/2 MMT in all planes for improved performance of functional mobility.          Start:  05/24/24    Expected End:  08/22/24              Pt will demonstrate full lumbar AROM without pain in all planes for improved performance of ADLs.          Start:  05/24/24    Expected End:  08/22/24              Pt will " ambulate long community distances across all surfaces without deviation and without pain.         Start:  05/24/24    Expected End:  08/22/24              Pt will ascend/descend 2 flights of stairs reciprocally without deviation and without pain for improved household and community mobility.         Start:  05/24/24    Expected End:  08/22/24              Pt will perform 5x sit to  10s without pain for improved functional strength and mobility.         Start:  05/24/24    Expected End:  08/22/24

## 2024-06-12 ENCOUNTER — TREATMENT (OUTPATIENT)
Dept: PHYSICAL THERAPY | Facility: CLINIC | Age: 59
End: 2024-06-12
Payer: COMMERCIAL

## 2024-06-12 DIAGNOSIS — M47.816 LUMBAR SPONDYLOSIS: ICD-10-CM

## 2024-06-12 DIAGNOSIS — M51.36 LUMBAR DEGENERATIVE DISC DISEASE: ICD-10-CM

## 2024-06-12 PROCEDURE — 97113 AQUATIC THERAPY/EXERCISES: CPT | Mod: CQ,GP | Performed by: SPECIALIST/TECHNOLOGIST

## 2024-06-14 NOTE — PROGRESS NOTES
Physical Therapy Treatment    Patient Name: Shayy Weeks  MRN: [id number]  Encounter date:  6/17/2024  Time Calculation  Start Time: 0815  Stop Time: 0900  Time Calculation (min): 45 min     PT Therapeutic Procedures Time Entry  Aquatic Therapy Time Entry: 40    Visit Number:  3 (including evaluation)  Planned total visits: 12  Visit Authorized/insurance coverage:  30.00 CO PAY, 100% COVERAGE, 25V THEN MED REV, UMR   Progress Report due visit #12    Plan for next visit: aquatics to address pain, mobility, core stability, flexibility       Current Problem  Problem List Items Addressed This Visit             ICD-10-CM    Lumbar spondylosis M47.816    Lumbar degenerative disc disease M51.36        Precautions  Precautions  Precautions Comment: Hx L4-5 fusion      Pain  Pain Assessment: 0-10  Pain Score: 0 - No pain    Subjective  General   Patient reports no soreness after last session.        Objective  Fair core control in deep end without UE     Treatment:  Patient entered pool with reciprocal gait, BUE support on rails    4' section     Across pool walks fwd, back lateral x 3 - 4 min     Standing at rail, each leg: Green water weights on water surface  -Heel Raises x 15  -Toe raises x 15  -Hip abduction/side kicks x 15 ea  -Hip Ext/kick backs x 15 ea  -SLR/Hip flexion/ forward kicks x 15     - march across pool 3 laps   -hip flex, knee ext, knee flex, hip ext x 10 ea     LTR with noodle 2 and 10 o'clock x 10   Rows with noodle slightly submerged x 10      Patient then moved to the 7' depth.  Completed:  - Hang with pool noodle to unweight LE and trunk x 5 minutes  - Hip abduction/side kicks x2 minutes  - Hang with pool noodle to unweight LE and trunk x 2 minutes  - Bicycles x 2 minutes.  - Hang with pool noodle to unweight LE and trunk x 2 minutes.  - Scissor Kicking/forward-backward x 2 minutes.  - Hang with pool noodle to unweight LE and trunk x 2 minutes     Re-acclimate to gravity: 1 lap across pool, fwd,  "bkwd, side to side  Standing hamstring stretch at pool steps, 2 x 30\" each L/R  Standing gastroc stretch at pool steps, 2 x 30\" ea Bilateral     Patient exited pool with reciprocal gait BUE support on rails    OP EDUCATION:       Assessment:   Continued with aquatics to address pain, mobility, core stability, flexibility of lumbopelvic musculature. The patient had no discomfort with instruction and verbalized feeling loose at exit.   Areas of improvements:  Improved stability and Improved strength  Limitations/deficits:  Weakness    Pain end of session:  0 \"I feel loose.\"     Plan:     Continue with current POC/no changes    Assessment of current progress against goals:  Progressing toward functional goals    Goals:  Active       PT Problem       STG, 5 visits:       Start:  05/24/24    Expected End:  07/08/24       Pt will be independent in HEP to improve LE and core strength, mobility, and function.  Pt will report 50% reduction in pain with functional mobility such as standing, walking, and stairs.         Pt will increase strength in core and LEs by 1/2 MMT in all planes for improved performance of functional mobility.        Start:  05/24/24    Expected End:  08/22/24            Pt will demonstrate full lumbar AROM without pain in all planes for improved performance of ADLs.        Start:  05/24/24    Expected End:  08/22/24            Pt will ambulate long community distances across all surfaces without deviation and without pain.       Start:  05/24/24    Expected End:  08/22/24            Pt will ascend/descend 2 flights of stairs reciprocally without deviation and without pain for improved household and community mobility.       Start:  05/24/24    Expected End:  08/22/24            Pt will perform 5x sit to  10s without pain for improved functional strength and mobility.       Start:  05/24/24    Expected End:  08/22/24                         "

## 2024-06-17 ENCOUNTER — TREATMENT (OUTPATIENT)
Dept: PHYSICAL THERAPY | Facility: CLINIC | Age: 59
End: 2024-06-17
Payer: COMMERCIAL

## 2024-06-17 DIAGNOSIS — M47.816 LUMBAR SPONDYLOSIS: ICD-10-CM

## 2024-06-17 DIAGNOSIS — E66.9 OBESITY, UNSPECIFIED: ICD-10-CM

## 2024-06-17 DIAGNOSIS — M51.36 LUMBAR DEGENERATIVE DISC DISEASE: ICD-10-CM

## 2024-06-17 DIAGNOSIS — F41.9 ANXIETY: ICD-10-CM

## 2024-06-17 PROCEDURE — 97113 AQUATIC THERAPY/EXERCISES: CPT | Mod: GP,CQ

## 2024-06-17 RX ORDER — BUPROPION HYDROCHLORIDE 300 MG/1
300 TABLET ORAL DAILY
Qty: 90 TABLET | Refills: 3 | Status: SHIPPED | OUTPATIENT
Start: 2024-06-17

## 2024-06-17 RX ORDER — CITALOPRAM 40 MG/1
40 TABLET, FILM COATED ORAL DAILY
Qty: 90 TABLET | Refills: 3 | Status: SHIPPED | OUTPATIENT
Start: 2024-06-17

## 2024-06-17 ASSESSMENT — PAIN SCALES - GENERAL: PAINLEVEL_OUTOF10: 0 - NO PAIN

## 2024-06-17 ASSESSMENT — PAIN - FUNCTIONAL ASSESSMENT: PAIN_FUNCTIONAL_ASSESSMENT: 0-10

## 2024-06-19 ENCOUNTER — APPOINTMENT (OUTPATIENT)
Dept: PHYSICAL THERAPY | Facility: CLINIC | Age: 59
End: 2024-06-19
Payer: COMMERCIAL

## 2024-06-24 NOTE — PROGRESS NOTES
Physical Therapy Treatment    Patient Name: Shayy Weeks  MRN: [id number]  Encounter date:  6/26/2024  Time Calculation  Start Time: 0816  Stop Time: 0858  Time Calculation (min): 42 min     PT Therapeutic Procedures Time Entry  Therapeutic Exercise Time Entry: 41    Visit Number:  4 (including evaluation)  Planned total visits: 12  Visit Authorized/insurance coverage:  30.00 CO PAY, 100% COVERAGE, 25V THEN MED REV, UMR   Progress Report due visit #12    Current Problem  Problem List Items Addressed This Visit             ICD-10-CM    Lumbar spondylosis M47.816    Lumbar degenerative disc disease M51.36      Precautions  Precautions  Precautions Comment: Monitor worsening symptoms      Pain  Pain Assessment: 0-10  0-10 (Numeric) Pain Score: 0 - No pain  Response to Interventions: Post treatment:  0    Subjective  General       PT  Visit  Response to Previous Treatment: Patient with no complaints from previous session.    Objective  Fair+ core control in standing    Treatment:  Therapeutic Exercise  Therapeutic Exercise Performed: Yes  Land (pool not available)    Nustep, L2, Ues and Les.  6 min    Hip Adduction Isometric with Ball 2 sets - 10 reps - 3 hold  Clamshell with Resistance  2 sets - 10 reps  Lower Trunk Rotation  2 sets - 10 reps    Seated Hamstring Stretch  3x30 second hold    //bars:  -Heel Raises x 20  -Toe raises x 20  -Hip abduction  x 12  -Hip Ext x 12  -SLR/Hip flexion  x12  Mini march 12  Sit back squat 2x12  Standing clam R/L    Seated:  LAQ 2x12     Billed Treatment Times:  Therapeutic Exercise 41 min    Current HEP:  Above progression of land exercises  Seated exercises from Eval    OP EDUCATION:  Outpatient Education  Individual(s) Educated: Patient  Education Provided: Home Exercise Program, Physiology    Assessment:  PT Assessment  Assessment Comment: Pt performed new exercises properly and with a good pace  Areas of improvements:  Decreased pain and Improved  stability  Limitations/deficits:  Weakness    Plan:     Continue with current POC/no changes    Assessment of current progress against goals:  Progressing toward functional goals    Goals:  Active       PT Problem       STG, 5 visits:       Start:  05/24/24    Expected End:  07/08/24       Pt will be independent in HEP to improve LE and core strength, mobility, and function.  Pt will report 50% reduction in pain with functional mobility such as standing, walking, and stairs.         Pt will increase strength in core and LEs by 1/2 MMT in all planes for improved performance of functional mobility.        Start:  05/24/24    Expected End:  08/22/24            Pt will demonstrate full lumbar AROM without pain in all planes for improved performance of ADLs.        Start:  05/24/24    Expected End:  08/22/24            Pt will ambulate long community distances across all surfaces without deviation and without pain.       Start:  05/24/24    Expected End:  08/22/24            Pt will ascend/descend 2 flights of stairs reciprocally without deviation and without pain for improved household and community mobility.       Start:  05/24/24    Expected End:  08/22/24            Pt will perform 5x sit to  10s without pain for improved functional strength and mobility.       Start:  05/24/24    Expected End:  08/22/24

## 2024-06-26 ENCOUNTER — TREATMENT (OUTPATIENT)
Dept: PHYSICAL THERAPY | Facility: CLINIC | Age: 59
End: 2024-06-26
Payer: COMMERCIAL

## 2024-06-26 DIAGNOSIS — M51.36 LUMBAR DEGENERATIVE DISC DISEASE: ICD-10-CM

## 2024-06-26 DIAGNOSIS — M47.816 LUMBAR SPONDYLOSIS: ICD-10-CM

## 2024-06-26 PROCEDURE — 97110 THERAPEUTIC EXERCISES: CPT | Mod: GP | Performed by: PHYSICAL THERAPIST

## 2024-06-26 ASSESSMENT — PAIN - FUNCTIONAL ASSESSMENT: PAIN_FUNCTIONAL_ASSESSMENT: 0-10

## 2024-06-26 ASSESSMENT — PAIN SCALES - GENERAL: PAINLEVEL_OUTOF10: 0 - NO PAIN

## 2024-06-26 NOTE — PROGRESS NOTES
Physical Therapy Treatment    Patient Name: Shayy Weeks  MRN: [id number]  Encounter date:  6/28/2024  Time Calculation  Start Time: 1031  Stop Time: 1117  Time Calculation (min): 46 min     PT Therapeutic Procedures Time Entry  Aquatic Therapy Time Entry: 43    Visit Number:  5 (including evaluation)  Planned total visits: 12  Visit Authorized/insurance coverage:  30.00 CO PAY, 100% COVERAGE, 25V THEN MED REV, UMR   Progress Report due visit #12    Current Problem  Problem List Items Addressed This Visit             ICD-10-CM    Lumbar spondylosis M47.816    Lumbar degenerative disc disease M51.36        Precautions         Pain  Pain Assessment: 0-10  0-10 (Numeric) Pain Score: 4  Response to Interventions: Post treatment;  0    Subjective  General  General Comment: Pain is up since she was sitting at the computer and didn't have time to do anything.  Pain is elevated today.  Felt really good after the last visit.    PT  Visit  Response to Previous Treatment: Patient with no complaints from previous session.    Objective  Slightly flexed forward posturing while standing and ambulating on deck    Treatment:  Aquatic Exercise  Aquatic Exercise Performed: Yes  AQUATICS:  Patient entered pool with reciprocal gait, BUE support on rails      4' section:     Across pool walks fwd, back lateral x 3 - 4 min       Standing at rail, each leg: Green WWs on water surface  -Heel Raises x 15 no WW  -Toe raises x 15 no WW    -Hip abduction/side kicks x 15 ea  -Hip Ext/kick backs x 15 ea  -SLR/Hip flexion/ forward kicks x 15     - march across pool 3 laps (no WW)  -hip flex, knee ext, knee flex, hip ext x 10 ea      With noodle:  LTR2 and 10 o'clock x 10   Rows slightly submerged x 10       7' depth with noodle  Hang to unweight LE and trunk x 5 minutes  - Hip abduction/side kicks x2 minutes  - Hang x 2 minutes  - Bicycles x 2 minutes.  - Hang  x 2 minutes.  - [place] x 2 minutes.  - Hang  x 2 minutes     Re-acclimate to  "gravity: 1 lap, fwd, bkwd, side to side    Stairs:    hamstring stretch 2 x 30\" each L/R  gastroc stretch 2 x 30\" ea Bilateral     Patient exited pool with reciprocal gait BUE support on rails  Billed Treatment Times:  Aquatic Exercise 43 min    Current HEP:  Standing LE strengthening and stretching exercises  Seated exercises from Eval    OP EDUCATION:  Outpatient Education  Individual(s) Educated: Patient  Education Provided: Home Exercise Program    Assessment:  PT Assessment  Assessment Comment: Pt felt more upright post treatment and pain reduced  Areas of improvements:  Decreased pain, Improved stability, and Improved strength  Limitations/deficits:  Weakness, Unstable, and pain    Plan:     Continue with current POC/no changes    Assessment of current progress against goals:  Progressing toward functional goals    Goals:  Active       PT Problem       STG, 5 visits:       Start:  05/24/24    Expected End:  07/08/24       Pt will be independent in HEP to improve LE and core strength, mobility, and function.  Pt will report 50% reduction in pain with functional mobility such as standing, walking, and stairs.         Pt will increase strength in core and LEs by 1/2 MMT in all planes for improved performance of functional mobility.        Start:  05/24/24    Expected End:  08/22/24            Pt will demonstrate full lumbar AROM without pain in all planes for improved performance of ADLs.        Start:  05/24/24    Expected End:  08/22/24            Pt will ambulate long community distances across all surfaces without deviation and without pain.       Start:  05/24/24    Expected End:  08/22/24            Pt will ascend/descend 2 flights of stairs reciprocally without deviation and without pain for improved household and community mobility.       Start:  05/24/24    Expected End:  08/22/24            Pt will perform 5x sit to  10s without pain for improved functional strength and mobility.       Start:  " 05/24/24    Expected End:  08/22/24

## 2024-06-28 ENCOUNTER — TREATMENT (OUTPATIENT)
Dept: PHYSICAL THERAPY | Facility: CLINIC | Age: 59
End: 2024-06-28
Payer: COMMERCIAL

## 2024-06-28 DIAGNOSIS — M51.36 LUMBAR DEGENERATIVE DISC DISEASE: ICD-10-CM

## 2024-06-28 DIAGNOSIS — M47.816 LUMBAR SPONDYLOSIS: ICD-10-CM

## 2024-06-28 PROCEDURE — 97113 AQUATIC THERAPY/EXERCISES: CPT | Mod: GP | Performed by: PHYSICAL THERAPIST

## 2024-06-28 ASSESSMENT — PAIN SCALES - GENERAL: PAINLEVEL_OUTOF10: 4

## 2024-06-28 ASSESSMENT — PAIN - FUNCTIONAL ASSESSMENT: PAIN_FUNCTIONAL_ASSESSMENT: 0-10

## 2024-07-01 ENCOUNTER — APPOINTMENT (OUTPATIENT)
Dept: RESPIRATORY THERAPY | Facility: HOSPITAL | Age: 59
End: 2024-07-01
Payer: COMMERCIAL

## 2024-07-01 NOTE — PROGRESS NOTES
Physical Therapy Treatment    Patient Name: Shayy Weeks  MRN: [id number]  Encounter date:  7/2/2024  Time Calculation  Start Time: 0816  Stop Time: 0859  Time Calculation (min): 43 min     PT Therapeutic Procedures Time Entry  Aquatic Therapy Time Entry: 43    Visit Number:  6 (including evaluation)  Planned total visits: 10 visits (Plan to start with aquatic intervention, transition to land as symptoms decrease and function improves)  Visits Authorized/Insurance Coverage:  30.00 CO PAY, 100% COVERAGE, 25V THEN MED REV, UMR     Current Problem  Problem List Items Addressed This Visit             ICD-10-CM    Lumbar spondylosis M47.816    Lumbar degenerative disc disease M51.36       Precautions  Precautions  Precautions Comment: Hx L4-5 fusion    General:       Pain  Pain Assessment: 0-10    Subjective  Pain has been higher the last 2 days without specific cause. Symptoms are present in R side of low back, worsen when she tries to stand tall. Pool has been helpful overall, notices improvements in standing and walking tolerance.     Objective  Good SLS stability in shallow water    Treatment:  Aquatic Exercise  Aquatic Exercise Performed: Yes  Walk x3 laps fwd, bwd, s/s across pool    Deep, 5' with noodle under arms, BUE support:  Decompression x2'  Hip abd/adduction x2'  XC ski x2'  Bike x2'  SKTC x2'  Decompression x2'    4' depth, cues for TrA brace, x15 ea, no UE support  Heel raise  Hip abduction  Hip extension  Hip flexion/SLR  Hamstring curl  March in place  Mini squat          Current HEP:  Access Code: [email or web address]  URL: [email or web address]  Date: 05/24/2024  Prepared by: Lan Taveras    Exercises  - Supine Posterior Pelvic Tilt  - 1 x daily - 7 x weekly - 2 sets - 10 reps - 3 second hold  - Supine Hip Adduction Isometric with Ball  - 1 x daily - 7 x weekly - 2 sets - 10 reps - 3 hold  - Hooklying Clamshell with Resistance  - 1 x daily - 7 x weekly - 2 sets - 10 reps  - Supine Lower Trunk Rotation   - 1 x daily - 7 x weekly - 2 sets - 10 reps  - Seated Hamstring Stretch  - 1 x daily - 7 x weekly - 1 sets - 3 reps - 30 second hold    Assessment:  Pt's response to treatment:  Pt with good pain relief throughout visit. Core/proximal stability improved evident with stance stability on single leg.   Areas of improvements:  overall standing, walking tolerance  Limitations/deficits:  recent flare up     Pain end of session: 0/10    Plan:  OP PT Plan  Treatment/Interventions: Aquatic therapy, Electrical stimulation, Manual therapy, Neuromuscular re-education, Therapeutic activities, Taping techniques, Therapeutic exercises, Ultrasound  PT Plan: Skilled PT  PT Frequency: 2 times per week  Duration: 10 visits (Plan to start with aquatic intervention, transition to land as symptoms decrease and function improves)  Certification Period Start Date: 05/24/24  Certification Period End Date: 08/22/24  Number of Treatments Authorized: 25 then auth  Rehab Potential: Good  Plan of Care Agreement: Patient  Continue with current POC/no changes    Assessment of current progress against goals:  Progressing toward functional goals    Goals:  Active       PT Problem       STG, 5 visits:       Start:  05/24/24    Expected End:  07/08/24       Pt will be independent in HEP to improve LE and core strength, mobility, and function.  Pt will report 50% reduction in pain with functional mobility such as standing, walking, and stairs.         Pt will increase strength in core and LEs by 1/2 MMT in all planes for improved performance of functional mobility.        Start:  05/24/24    Expected End:  08/22/24            Pt will demonstrate full lumbar AROM without pain in all planes for improved performance of ADLs.        Start:  05/24/24    Expected End:  08/22/24            Pt will ambulate long community distances across all surfaces without deviation and without pain.       Start:  05/24/24    Expected End:  08/22/24            Pt will  ascend/descend 2 flights of stairs reciprocally without deviation and without pain for improved household and community mobility.       Start:  05/24/24    Expected End:  08/22/24            Pt will perform 5x sit to  10s without pain for improved functional strength and mobility.       Start:  05/24/24    Expected End:  08/22/24

## 2024-07-02 ENCOUNTER — TREATMENT (OUTPATIENT)
Dept: PHYSICAL THERAPY | Facility: CLINIC | Age: 59
End: 2024-07-02
Payer: COMMERCIAL

## 2024-07-02 DIAGNOSIS — M47.816 LUMBAR SPONDYLOSIS: ICD-10-CM

## 2024-07-02 DIAGNOSIS — M51.36 LUMBAR DEGENERATIVE DISC DISEASE: ICD-10-CM

## 2024-07-02 PROCEDURE — 97113 AQUATIC THERAPY/EXERCISES: CPT | Mod: GP

## 2024-07-02 ASSESSMENT — PAIN - FUNCTIONAL ASSESSMENT: PAIN_FUNCTIONAL_ASSESSMENT: 0-10

## 2024-07-08 ENCOUNTER — TREATMENT (OUTPATIENT)
Dept: PHYSICAL THERAPY | Facility: CLINIC | Age: 59
End: 2024-07-08
Payer: COMMERCIAL

## 2024-07-08 DIAGNOSIS — M51.36 LUMBAR DEGENERATIVE DISC DISEASE: ICD-10-CM

## 2024-07-08 DIAGNOSIS — M47.816 LUMBAR SPONDYLOSIS: ICD-10-CM

## 2024-07-08 PROCEDURE — 97113 AQUATIC THERAPY/EXERCISES: CPT | Mod: GP

## 2024-07-08 ASSESSMENT — PAIN - FUNCTIONAL ASSESSMENT: PAIN_FUNCTIONAL_ASSESSMENT: 0-10

## 2024-07-08 ASSESSMENT — PAIN SCALES - GENERAL: PAINLEVEL_OUTOF10: 5 - MODERATE PAIN

## 2024-07-08 NOTE — PROGRESS NOTES
Physical Therapy Treatment    Patient Name: Shayy Weeks  MRN: [id number]  Encounter date:  7/8/2024  Time Calculation  Start Time: 0830  Stop Time: 0912  Time Calculation (min): 42 min     PT Therapeutic Procedures Time Entry  Aquatic Therapy Time Entry: 42    Visit Number:  7 (including evaluation)  Planned total visits: 10 visits (Plan to start with aquatic intervention, transition to land as symptoms decrease and function improves)  Visits Authorized/Insurance Coverage:  30.00 CO PAY, 100% COVERAGE, 25V THEN MED REV, UMR     Current Problem  Problem List Items Addressed This Visit             ICD-10-CM    Lumbar spondylosis M47.816    Lumbar degenerative disc disease M51.36         Precautions  Precautions  Precautions Comment: Hx L4-5 fusion    General:  General  Reason for Referral: LBP  Referred By: Abi Teague PA-C    Pain  Pain Assessment: 0-10  0-10 (Numeric) Pain Score: 5 - Moderate pain    Subjective  Pt had about a day of relief after last visit but then symptoms returned across low back. Pain comes and goes but has been more present the past few days.     Objective  Mild flexed trunk posture on pool deck    Treatment:  Aquatic Exercise  Aquatic Exercise Performed: Yes  Walk x3 laps fwd, bwd, s/s across pool    Deep, 5' with noodle under arms, BUE support:  Decompression x2'  Hip abd/adduction x2'  SKTC x2'  DKTC 2x1'  XC ski x2'  Bike x2'  Decompression x2'    Repeated above x2    Current HEP:  Access Code: [email or web address]  URL: [email or web address]  Date: 05/24/2024  Prepared by: Lan Taveras    Exercises  - Supine Posterior Pelvic Tilt  - 1 x daily - 7 x weekly - 2 sets - 10 reps - 3 second hold  - Supine Hip Adduction Isometric with Ball  - 1 x daily - 7 x weekly - 2 sets - 10 reps - 3 hold  - Hooklying Clamshell with Resistance  - 1 x daily - 7 x weekly - 2 sets - 10 reps  - Supine Lower Trunk Rotation  - 1 x daily - 7 x weekly - 2 sets - 10 reps  - Seated Hamstring Stretch  - 1 x daily  - 7 x weekly - 1 sets - 3 reps - 30 second hold    Assessment:  Pt's response to treatment: More time spent in deep water for pain relief, and pt responded well within visit. Next session to be on land to modify/update HEP due to recent increase in symptoms.   Areas of improvements: symptom relief in deep water  Limitations/deficits: recent increase in pain across back    Pain end of session: 2/10    Plan:  OP PT Plan  Treatment/Interventions: Aquatic therapy, Electrical stimulation, Manual therapy, Neuromuscular re-education, Therapeutic activities, Taping techniques, Therapeutic exercises, Ultrasound  PT Plan: Skilled PT  PT Frequency: 2 times per week  Duration: 10 visits (Plan to start with aquatic intervention, transition to land as symptoms decrease and function improves)  Certification Period Start Date: 05/24/24  Certification Period End Date: 08/22/24  Number of Treatments Authorized: 25 then auth  Rehab Potential: Good  Plan of Care Agreement: Patient  Continue with current POC/no changes    Assessment of current progress against goals:  Progressing toward functional goals    Goals:  Active       PT Problem       STG, 5 visits:       Start:  05/24/24    Expected End:  07/08/24       Pt will be independent in HEP to improve LE and core strength, mobility, and function.  Pt will report 50% reduction in pain with functional mobility such as standing, walking, and stairs.         Pt will increase strength in core and LEs by 1/2 MMT in all planes for improved performance of functional mobility.        Start:  05/24/24    Expected End:  08/22/24            Pt will demonstrate full lumbar AROM without pain in all planes for improved performance of ADLs.        Start:  05/24/24    Expected End:  08/22/24            Pt will ambulate long community distances across all surfaces without deviation and without pain.       Start:  05/24/24    Expected End:  08/22/24            Pt will ascend/descend 2 flights of stairs  reciprocally without deviation and without pain for improved household and community mobility.       Start:  05/24/24    Expected End:  08/22/24            Pt will perform 5x sit to  10s without pain for improved functional strength and mobility.       Start:  05/24/24    Expected End:  08/22/24

## 2024-07-10 ENCOUNTER — TREATMENT (OUTPATIENT)
Dept: PHYSICAL THERAPY | Facility: CLINIC | Age: 59
End: 2024-07-10
Payer: COMMERCIAL

## 2024-07-10 DIAGNOSIS — M51.36 LUMBAR DEGENERATIVE DISC DISEASE: ICD-10-CM

## 2024-07-10 DIAGNOSIS — M47.816 LUMBAR SPONDYLOSIS: ICD-10-CM

## 2024-07-10 PROCEDURE — 97110 THERAPEUTIC EXERCISES: CPT | Mod: GP

## 2024-07-10 ASSESSMENT — PAIN - FUNCTIONAL ASSESSMENT: PAIN_FUNCTIONAL_ASSESSMENT: 0-10

## 2024-07-10 ASSESSMENT — PAIN SCALES - GENERAL: PAINLEVEL_OUTOF10: 3

## 2024-07-10 NOTE — PROGRESS NOTES
"    Physical Therapy Treatment    Patient Name: Shayy Weeks  MRN: [id number]  Encounter date:  7/10/2024  Time Calculation  Start Time: 0816  Stop Time: 0859  Time Calculation (min): 43 min     PT Therapeutic Procedures Time Entry  Therapeutic Exercise Time Entry: 43    Visit Number:  8 (including evaluation)  Planned total visits: 10 visits (Plan to start with aquatic intervention, transition to land as symptoms decrease and function improves)  Visits Authorized/Insurance Coverage:  30.00 CO PAY, 100% COVERAGE, 25V THEN MED REV, UMR     Current Problem  Problem List Items Addressed This Visit             ICD-10-CM    Lumbar spondylosis M47.816    Lumbar degenerative disc disease M51.36           Precautions  Precautions  Precautions Comment: Hx L4-5 fusion    General:  General  Reason for Referral: LBP  Referred By: Abi Teague PA-C    Pain  Pain Assessment: 0-10  0-10 (Numeric) Pain Score: 3    Subjective  Pt on land today. Symptoms are a little better, and felt more on the R side of her low back. Sitting seems to make pain worse.     Objective  Mild tenderness around R>L SIJ    Treatment:  Therapeutic Exercise  Therapeutic Exercise Performed: Yes  NuStep L1 x 4'    Seated:  Lumbar flexion with blue exercise ball x10 5\" hold    Supine:  DKTC green ball x15 3\"  Figure 4 piriformis push/pull 3x30\" ea   PPT x10 3\" hold  + march x10   + BKFO x10   + bridge x10     Seated on dynadisc, holding purple ball to challenge core stability:  March 2x10  LAQ 2x10  Chest press 2x10  Overhead press 2x10    Current HEP:  Access Code: [email or web address]  URL: [email or web address]  Date: 05/24/2024  Prepared by: Lan Taveras    Exercises  - Supine Posterior Pelvic Tilt  - 1 x daily - 7 x weekly - 2 sets - 10 reps - 3 second hold  - Supine Hip Adduction Isometric with Ball  - 1 x daily - 7 x weekly - 2 sets - 10 reps - 3 hold  - Hooklying Clamshell with Resistance  - 1 x daily - 7 x weekly - 2 sets - 10 reps  - Supine Lower " "Trunk Rotation  - 1 x daily - 7 x weekly - 2 sets - 10 reps  - Seated Hamstring Stretch  - 1 x daily - 7 x weekly - 1 sets - 3 reps - 30 second hold    - Figure 4 piriformis push/pull 3x30\" ea     Assessment:  Pt's response to treatment: Pt on land today due to recent increase in pain. HEP updated with additional stretches to address pain at home. Pt with good relief in visit during and following session.   Areas of improvements: mobility within visit  Limitations/deficits: pain at home     Pain end of session: 0/10    Plan:  OP PT Plan  Treatment/Interventions: Aquatic therapy, Electrical stimulation, Manual therapy, Neuromuscular re-education, Therapeutic activities, Taping techniques, Therapeutic exercises, Ultrasound  PT Plan: Skilled PT  PT Frequency: 2 times per week  Duration: 10 visits (Plan to start with aquatic intervention, transition to land as symptoms decrease and function improves)  Certification Period Start Date: 05/24/24  Certification Period End Date: 08/22/24  Number of Treatments Authorized: 25 then auth  Rehab Potential: Good  Plan of Care Agreement: Patient  Continue with current POC/no changes    Assessment of current progress against goals:  Progressing toward functional goals    Goals:  Active       PT Problem       STG, 5 visits: (Met)       Start:  05/24/24    Expected End:  07/08/24    Resolved:  07/10/24    Pt will be independent in HEP to improve LE and core strength, mobility, and function.  Pt will report 50% reduction in pain with functional mobility such as standing, walking, and stairs.         Pt will increase strength in core and LEs by 1/2 MMT in all planes for improved performance of functional mobility.        Start:  05/24/24    Expected End:  08/22/24            Pt will demonstrate full lumbar AROM without pain in all planes for improved performance of ADLs.        Start:  05/24/24    Expected End:  08/22/24            Pt will ambulate long community distances across all " surfaces without deviation and without pain.       Start:  05/24/24    Expected End:  08/22/24            Pt will ascend/descend 2 flights of stairs reciprocally without deviation and without pain for improved household and community mobility.       Start:  05/24/24    Expected End:  08/22/24            Pt will perform 5x sit to  10s without pain for improved functional strength and mobility.       Start:  05/24/24    Expected End:  08/22/24

## 2024-07-11 ENCOUNTER — LAB (OUTPATIENT)
Dept: LAB | Facility: LAB | Age: 59
End: 2024-07-11
Payer: COMMERCIAL

## 2024-07-11 DIAGNOSIS — I10 PRIMARY HYPERTENSION: ICD-10-CM

## 2024-07-11 LAB
ANION GAP SERPL CALC-SCNC: 12 MMOL/L
BUN SERPL-MCNC: 20 MG/DL (ref 8–25)
CALCIUM SERPL-MCNC: 9.3 MG/DL (ref 8.5–10.4)
CHLORIDE SERPL-SCNC: 107 MMOL/L (ref 97–107)
CO2 SERPL-SCNC: 22 MMOL/L (ref 24–31)
CREAT SERPL-MCNC: 1 MG/DL (ref 0.4–1.6)
EGFRCR SERPLBLD CKD-EPI 2021: 65 ML/MIN/1.73M*2
GLUCOSE SERPL-MCNC: 100 MG/DL (ref 65–99)
POTASSIUM SERPL-SCNC: 4.3 MMOL/L (ref 3.4–5.1)
SODIUM SERPL-SCNC: 141 MMOL/L (ref 133–145)

## 2024-07-11 PROCEDURE — 36415 COLL VENOUS BLD VENIPUNCTURE: CPT

## 2024-07-11 PROCEDURE — 80048 BASIC METABOLIC PNL TOTAL CA: CPT

## 2024-07-12 NOTE — PROGRESS NOTES
"    Physical Therapy Treatment    Patient Name: Shayy Weeks  MRN: [id number]  Encounter date:  7/15/2024             Visit Number:  Visit count could not be calculated. Make sure you are using a visit which is associated with an episode. (including evaluation)  Planned total visits:    Visits Authorized/Insurance Coverage:  30.00 CO PAY, 100% COVERAGE, 25V THEN MED REV, UMR     Current Problem  Problem List Items Addressed This Visit    None            Precautions       General:       Pain       Subjective  ***    Objective  ***    Treatment:     NuStep L1 x 4'    Seated:  Lumbar flexion with blue exercise ball x10 5\" hold    Supine:  DKTC green ball x15 3\"  Figure 4 piriformis push/pull 3x30\" ea   PPT x10 3\" hold  + march x10   + BKFO x10   + bridge x10     Seated on dynadisc, holding purple ball to challenge core stability:  March 2x10  LAQ 2x10  Chest press 2x10  Overhead press 2x10    Current HEP:  Access Code: [email or web address]  URL: [email or web address]  Date: 05/24/2024  Prepared by: Lan Taveras    Exercises  - Supine Posterior Pelvic Tilt  - 1 x daily - 7 x weekly - 2 sets - 10 reps - 3 second hold  - Supine Hip Adduction Isometric with Ball  - 1 x daily - 7 x weekly - 2 sets - 10 reps - 3 hold  - Hooklying Clamshell with Resistance  - 1 x daily - 7 x weekly - 2 sets - 10 reps  - Supine Lower Trunk Rotation  - 1 x daily - 7 x weekly - 2 sets - 10 reps  - Seated Hamstring Stretch  - 1 x daily - 7 x weekly - 1 sets - 3 reps - 30 second hold    - Figure 4 piriformis push/pull 3x30\" ea     Assessment:  Pt's response to treatment: ***  Areas of improvements: ***  Limitations/deficits: ***    Pain end of session: ***/10    Plan:     Continue with current POC/no changes    Assessment of current progress against goals:  Progressing toward functional goals    Goals:            "

## 2024-07-15 ENCOUNTER — APPOINTMENT (OUTPATIENT)
Dept: PHYSICAL THERAPY | Facility: CLINIC | Age: 59
End: 2024-07-15
Payer: COMMERCIAL

## 2024-07-21 DIAGNOSIS — I10 HYPERTENSION, UNSPECIFIED TYPE: ICD-10-CM

## 2024-07-22 RX ORDER — SPIRONOLACTONE 25 MG/1
25 TABLET ORAL DAILY
Qty: 90 TABLET | Refills: 3 | Status: SHIPPED | OUTPATIENT
Start: 2024-07-22 | End: 2025-07-22

## 2024-07-22 NOTE — PROGRESS NOTES
"    Physical Therapy Treatment    Patient Name: Shayy Weeks  MRN: [id number]  Encounter date:  7/23/2024  Time Calculation  Start Time: 1200  Stop Time: 1245  Time Calculation (min): 45 min     PT Therapeutic Procedures Time Entry  Therapeutic Exercise Time Entry: 45    Visit Number:  9 (including evaluation)  Planned total visits: 10 visits (Plan to start with aquatic intervention, transition to land as symptoms decrease and function improves)  Visits Authorized/Insurance Coverage:  30.00 CO PAY, 100% COVERAGE, 25V THEN MED REV, UMR     Current Problem  Problem List Items Addressed This Visit             ICD-10-CM    Lumbar spondylosis M47.816    Lumbar degenerative disc disease M51.36         Precautions  Precautions  Precautions Comment: Hx L4-5 fusion    General:  General  Reason for Referral: LBP  Referred By: Abi Teague PA-C    Pain  Pain Assessment: 0-10  0-10 (Numeric) Pain Score: 3    Subjective  Back has been better overall but can occasionally flare up. R knee has been painful without cause. Pt would like to try continuing with independent exercise after today.     Objective  Mild tenderness R knee just distal to medial joint line. Decreased RLE stance phase during gait due to pain    Treatment:  Therapeutic Exercise  Therapeutic Exercise Performed: Yes  NuStep L1 x 4'    Seated:  Lumbar flexion with blue exercise ball x10 5\" hold    Supine:  SLR x10 ea  DKTC green ball x20 3\"  DL bridge green ball 2x10  LTR green ball x15  Bridge with iso hip adduction 2x10   Bridge with iso hip abduction yellow loop 2x10     L1 clamshell x15 ea    Standing:  MoFlex calf stretch 3x30\"   Heel raise x15  Hamstring curl x10  3-way hip x15    Current HEP:  Access Code: [email or web address]  URL: [email or web address]  Date: 05/24/2024  Prepared by: Lan Taveras    Exercises  - Supine Posterior Pelvic Tilt  - 1 x daily - 7 x weekly - 2 sets - 10 reps - 3 second hold  - Supine Hip Adduction Isometric with Ball  - 1 x daily " "- 7 x weekly - 2 sets - 10 reps - 3 hold  - Hooklying Clamshell with Resistance  - 1 x daily - 7 x weekly - 2 sets - 10 reps  - Supine Lower Trunk Rotation  - 1 x daily - 7 x weekly - 2 sets - 10 reps  - Seated Hamstring Stretch  - 1 x daily - 7 x weekly - 1 sets - 3 reps - 30 second hold    - Figure 4 piriformis push/pull 3x30\" ea     Assessment:  Pt's response to treatment: Pt with good reduction in back pain recently, better standing and walking tolerance before needing to rest. Pt tolerated progression well but did have some knee pain in standing. HEP updated with supine exercises performed today, and pt to be placed on hold to progress with HEP following visit. Pt may return within 4 weeks if symptoms indicate need for continued care.    Pain end of session: No change    Plan:  Hold 4 weeks    Assessment of current progress against goals:  Progressing toward functional goals    Goals:  Active       PT Problem       STG, 5 visits: (Met)       Start:  05/24/24    Expected End:  07/08/24    Resolved:  07/10/24    Pt will be independent in HEP to improve LE and core strength, mobility, and function.  Pt will report 50% reduction in pain with functional mobility such as standing, walking, and stairs.         Pt will increase strength in core and LEs by 1/2 MMT in all planes for improved performance of functional mobility.        Start:  05/24/24    Expected End:  08/22/24            Pt will demonstrate full lumbar AROM without pain in all planes for improved performance of ADLs.        Start:  05/24/24    Expected End:  08/22/24            Pt will ambulate long community distances across all surfaces without deviation and without pain.       Start:  05/24/24    Expected End:  08/22/24            Pt will ascend/descend 2 flights of stairs reciprocally without deviation and without pain for improved household and community mobility.       Start:  05/24/24    Expected End:  08/22/24            Pt will perform 5x sit to "  10s without pain for improved functional strength and mobility.       Start:  05/24/24    Expected End:  08/22/24

## 2024-07-23 ENCOUNTER — TREATMENT (OUTPATIENT)
Dept: PHYSICAL THERAPY | Facility: CLINIC | Age: 59
End: 2024-07-23
Payer: COMMERCIAL

## 2024-07-23 DIAGNOSIS — M47.816 LUMBAR SPONDYLOSIS: ICD-10-CM

## 2024-07-23 DIAGNOSIS — M51.36 LUMBAR DEGENERATIVE DISC DISEASE: ICD-10-CM

## 2024-07-23 PROCEDURE — 97110 THERAPEUTIC EXERCISES: CPT | Mod: GP

## 2024-07-23 ASSESSMENT — PAIN SCALES - GENERAL: PAINLEVEL_OUTOF10: 3

## 2024-07-23 ASSESSMENT — PAIN - FUNCTIONAL ASSESSMENT: PAIN_FUNCTIONAL_ASSESSMENT: 0-10

## 2024-08-22 ENCOUNTER — DOCUMENTATION (OUTPATIENT)
Dept: PHYSICAL THERAPY | Facility: CLINIC | Age: 59
End: 2024-08-22
Payer: COMMERCIAL

## 2024-08-22 NOTE — PROGRESS NOTES
Discharge Summary    Name: Shayy Weeks  MRN: [id number]  : 1965  Date: 24    Discharge Summary: PT    Discharge Information: Date of discharge 24 and Date of last visit 24    Therapy Summary: Pt placed on hold following last session reporting minimal pain and ability to continue independently. Pt did not return for further treatment.    Rehab Discharge Reason: Achieved all and/or the most significant goals(s)

## 2024-10-22 ENCOUNTER — APPOINTMENT (OUTPATIENT)
Dept: ORTHOPEDIC SURGERY | Facility: CLINIC | Age: 59
End: 2024-10-22
Payer: COMMERCIAL

## 2024-10-22 DIAGNOSIS — M25.561 RIGHT KNEE PAIN, UNSPECIFIED CHRONICITY: ICD-10-CM

## 2024-10-28 ENCOUNTER — APPOINTMENT (OUTPATIENT)
Dept: PRIMARY CARE | Facility: CLINIC | Age: 59
End: 2024-10-28
Payer: COMMERCIAL

## 2024-10-30 DIAGNOSIS — I10 HYPERTENSION, UNSPECIFIED TYPE: ICD-10-CM

## 2024-11-01 DIAGNOSIS — F41.9 ANXIETY: ICD-10-CM

## 2024-11-06 ENCOUNTER — OFFICE VISIT (OUTPATIENT)
Dept: CARDIOLOGY | Facility: HOSPITAL | Age: 59
End: 2024-11-06
Payer: COMMERCIAL

## 2024-11-06 VITALS
BODY MASS INDEX: 38.56 KG/M2 | HEIGHT: 67 IN | OXYGEN SATURATION: 97 % | WEIGHT: 245.7 LBS | SYSTOLIC BLOOD PRESSURE: 109 MMHG | HEART RATE: 73 BPM | DIASTOLIC BLOOD PRESSURE: 70 MMHG

## 2024-11-06 DIAGNOSIS — I10 PRIMARY HYPERTENSION: ICD-10-CM

## 2024-11-06 DIAGNOSIS — I10 HYPERTENSION, UNSPECIFIED TYPE: ICD-10-CM

## 2024-11-06 DIAGNOSIS — E78.5 DYSLIPIDEMIA: ICD-10-CM

## 2024-11-06 DIAGNOSIS — I50.32 CHRONIC DIASTOLIC HEART FAILURE: Primary | ICD-10-CM

## 2024-11-06 DIAGNOSIS — E66.812 CLASS 2 OBESITY WITHOUT SERIOUS COMORBIDITY WITH BODY MASS INDEX (BMI) OF 38.0 TO 38.9 IN ADULT, UNSPECIFIED OBESITY TYPE: ICD-10-CM

## 2024-11-06 LAB
ATRIAL RATE: 73 BPM
P AXIS: 42 DEGREES
P OFFSET: 201 MS
P ONSET: 143 MS
PR INTERVAL: 154 MS
Q ONSET: 220 MS
QRS COUNT: 12 BEATS
QRS DURATION: 78 MS
QT INTERVAL: 416 MS
QTC CALCULATION(BAZETT): 458 MS
QTC FREDERICIA: 444 MS
R AXIS: -8 DEGREES
T AXIS: 17 DEGREES
T OFFSET: 428 MS
VENTRICULAR RATE: 73 BPM

## 2024-11-06 PROCEDURE — 1036F TOBACCO NON-USER: CPT | Performed by: INTERNAL MEDICINE

## 2024-11-06 PROCEDURE — 3008F BODY MASS INDEX DOCD: CPT | Performed by: INTERNAL MEDICINE

## 2024-11-06 PROCEDURE — 99214 OFFICE O/P EST MOD 30 MIN: CPT | Performed by: INTERNAL MEDICINE

## 2024-11-06 PROCEDURE — 3078F DIAST BP <80 MM HG: CPT | Performed by: INTERNAL MEDICINE

## 2024-11-06 PROCEDURE — 93005 ELECTROCARDIOGRAM TRACING: CPT | Performed by: INTERNAL MEDICINE

## 2024-11-06 PROCEDURE — 3074F SYST BP LT 130 MM HG: CPT | Performed by: INTERNAL MEDICINE

## 2024-11-06 RX ORDER — VITAMIN B COMPLEX
1 CAPSULE ORAL DAILY
COMMUNITY

## 2024-11-06 NOTE — PATIENT INSTRUCTIONS
Increase your physical activity and continue to work on losing weight.  Check CMP, CBC, fasting lipid profile, and A1c.  Follow-up in 6 months.

## 2024-11-06 NOTE — PROGRESS NOTES
Primary Care Physician: Aubrie Dewey MD  Date of Visit: 11/06/2024  1:00 PM EST  Location of visit: [facility]     Chief Complaint:   Chief Complaint   Patient presents with    Follow-up        HPI / Summary:   Shayy Weeks is a 59 y.o. female presents for followup.  She has no particular complaints.  She is able to perform housework and walk up and down stairs and do water aerobics twice a week without chest pain or shortness of breath.  The patient denies chest pain, shortness of breath, palpitations, lightheadedness, syncope, orthopnea, paroxysmal nocturnal dyspnea, lower extremity edema, or bleeding problems.      Past Medical History:  Past Medical History:   Diagnosis Date    Atypical squamous cells of undetermined significance on cytologic smear of cervix (ASC-US) 08/26/2016    ASCUS with positive high risk HPV cervical    Carpal tunnel syndrome, right upper limb 10/29/2017    Carpal tunnel syndrome of right wrist    Cervical high risk human papillomavirus (HPV) DNA test positive 06/01/2015    Cervical high risk HPV (human papillomavirus) test positive    Chronic obstructive pulmonary disease with (acute) exacerbation (Multi) 09/21/2016    COPD exacerbation    Noninfective gastroenteritis and colitis, unspecified 12/29/2016    Acute gastroenteritis    Nontoxic single thyroid nodule 09/28/2020    Solitary thyroid nodule    Osteoarthritis of knee, unspecified 04/05/2022    Osteoarthritis of knee    Other abnormal cytological findings on specimens from cervix uteri 12/19/2014    Unexplained endometrial cells on cervical Pap smear    Other muscle spasm 03/31/2016    Cervical paraspinous muscle spasm    Other muscle spasm 03/31/2016    Trapezius muscle spasm    Pain in right hand 09/07/2017    Pain of right hand    Personal history of colonic polyps 07/27/2016    History of adenomatous polyp of colon    Personal history of other diseases of the respiratory system 06/25/2014    History of acute  pharyngitis    Personal history of other diseases of the respiratory system 03/17/2014    History of acute bronchitis    Personal history of other diseases of the respiratory system 03/17/2014    History of acute sinusitis    Personal history of other endocrine, nutritional and metabolic disease 07/19/2022    History of diabetes mellitus    Personal history of other specified conditions 09/21/2016    History of wheezing    Personal history of other specified conditions 12/28/2020    History of weight gain    Personal history of other specified conditions 08/02/2013    History of abdominal pain    Radiculopathy, cervical region 03/31/2016    Cervical radiculopathy, acute    Unspecified acute lower respiratory infection 06/29/2017    Acute lower respiratory infection        Past Surgical History:  Past Surgical History:   Procedure Laterality Date    CERVICAL FUSION  08/02/2013    Cervical Vertebral Fusion    LUMBAR LAMINECTOMY  08/02/2013    Laminectomy Lumbar    OTHER SURGICAL HISTORY  08/02/2013    Oophorectomy - Bilateral (Removal Of Both Ovaries)          Social History:   reports that she has quit smoking. Her smoking use included cigarettes. She has a 10 pack-year smoking history. She has quit using smokeless tobacco. She reports current alcohol use of about 1.0 standard drink of alcohol per week. She reports that she does not use drugs.     Family History:  family history includes CARDIAC DISORDER in her father; Frontotemporal dementia in her mother.      Allergies:  No Known Allergies      Outpatient Medications:  Current Outpatient Medications   Medication Instructions    atorvastatin (Lipitor) 10 mg tablet TAKE 1 TABLET BY MOUTH EVERYDAY AT BEDTIME    azelastine (Astelin) 137 mcg (0.1 %) nasal spray 2 sprays, Each Nostril, 2 times daily, Use in each nostril as directed    b complex vitamins capsule 1 capsule, Daily    buPROPion XL (WELLBUTRIN XL) 300 mg, oral, Daily    calcium carbonate/vitamin D3 (CALCIUM  "600 + D,3, ORAL) 1 tablet    citalopram (CELEXA) 40 mg, oral, Daily    empagliflozin (JARDIANCE) 10 mg, oral, Daily    fluticasone (Flonase) 50 mcg/actuation nasal spray 2 sprays, Each Nostril, 2 times daily, Shake gently. Before first use, prime pump. After use, clean tip and replace cap.    losartan (COZAAR) 50 mg, oral, Daily    meloxicam (MOBIC) 7.5 mg, oral, 2 times daily PRN    methocarbamol (ROBAXIN) 500 mg, oral, 4 times daily PRN    spironolactone (ALDACTONE) 25 mg, oral, Daily       Physical Exam:  Vitals:    11/06/24 1251   BP: 109/70   BP Location: Right arm   Pulse: 73   SpO2: 97%   Weight: 111 kg (245 lb 11.2 oz)   Height: 1.702 m (5' 7\")     Wt Readings from Last 5 Encounters:   11/06/24 111 kg (245 lb 11.2 oz)   05/16/24 105 kg (232 lb)   05/06/24 108 kg (239 lb 1.6 oz)   04/10/24 109 kg (240 lb 3.2 oz)   03/05/24 109 kg (240 lb 9.6 oz)     Body mass index is 38.48 kg/m².   General: Well-developed well-nourished in no acute distress  HEENT: Normocephalic atraumatic  Neck: Supple, JVP is normal negative hepatojugular reflux 2+ carotid pulses without bruit  Pulmonary: Normal respiratory effort, clear to auscultation  Cardiovascular: No right ventricular heave, normal S1 and S2, 2 out of 6 early peaking systolic ejection murmur no rubs or gallops  Abdomen: Soft nontender nondistended  Extremities: Warm without edema 2+ radial pulses bilaterally 2+ dorsalis pedis pulses bilaterally  Neurologic: Alert and oriented x3  Psychiatric: Normal mood and affect     Last Labs:  CMP:  Recent Labs     07/11/24  1243 03/21/24  1207 12/18/23  0833 11/22/23  1030 10/24/23  0854    143 139 142 141   K 4.3 4.4 4.8 4.5 4.6    107 107 107 108*   CO2 22* 23* 18* 21* 22   ANIONGAP 12 13 14 14 16   BUN 20 14 22 22 17   CREATININE 1.00 1.10 1.00 1.10 1.05   EGFR 65 58* 65 58* 61   GLUCOSE 100* 91 118* 119* 112*     Recent Labs     10/24/23  0854 04/04/23  1005 10/27/22  1341 10/11/22  1310 04/05/22  0940   ALBUMIN " "4.5 4.3 4.6 4.3 4.6   ALKPHOS 100 115* 104 111* 104   ALT 14 20 22 22 17   AST 13 15 17 17 15   BILITOT 0.4 0.5 0.5 0.5 0.6     CBC:  Recent Labs     10/24/23  0854 10/27/22  1341 12/03/21  0946 10/04/21  0832 07/16/21  1427   WBC 7.1 7.5 6.5 6.4 6.7   HGB 13.3 13.4 12.8 12.5 12.6   HCT 41.1 40.5 40.4 39.9 38.5    276 241 239 216   MCV 97 96 97 101* 97.0     COAG:   Recent Labs     11/16/21  0928 07/16/21  1427   INR  --  0.9   DDIMERVTE 329  --      HEME/ENDO:  Recent Labs     10/24/23  0854 10/27/22  1341 10/04/21  0832 01/27/21  0947   TSH 0.98 0.82 0.62 0.59      CARDIAC: No results for input(s): \"LDH\", \"CKMB\", \"TROPHS\", \"BNP\" in the last 73004 hours.    No lab exists for component: \"CK\", \"CKMBP\"  Recent Labs     11/22/23  1030 04/04/23  1005 10/27/22  1341 04/05/22  0940   CHOL 126* 161 145 129   LDLF  --  84 59 62   LDLCALC 60*  --   --   --    HDL 39.0* 44.1 47.5 40.8   TRIG 133 167* 195* 130       Last Cardiology Tests:  ECG:  Electrocardiogram performed today that I reviewed shows normal sinus rhythm with voltage criteria for LVH.    Echo:  Echocardiogram December 4, 2023  CONCLUSIONS:   1. Left ventricular systolic function is normal with a 60-65% estimated ejection fraction.   2. The left atrium is moderately dilated.   3. RVSP within normal limits.    Echocardiogram November 23, 2021  CONCLUSIONS:   1. The left ventricular systolic function is normal with a 55-60% estimated ejection fraction.          Cath:      Stress Test:  Stress Results:  No results found for this or any previous visit from the past 365 days.         Cardiac Imaging:  CT lung screen December 18, 2023  Heart and great vessels demonstrate ascending thoracic aorta to  measure 3.3 cm. Minimal vascular calcification of the thoracic aorta.  No cardiomegaly demonstrated.      CT lung screening December 15, 2022  LUNGS AND AIRWAYS:  The trachea and central airways are patent. No endobronchial lesion.  There is minimal emphysematous " changes with centrilobular  ground-glass opacities consistent with a component of respiratory  bronchiolitis.  There are no suspicious lung nodules.  No focal infiltrates or effusions.  There is minimal lingular atelectasis.     MEDIASTINUM AND NICK, LOWER NECK AND AXILLA:  There is asymmetric enlargement with central calcification of the  left lobe of the thyroid gland.  There are scattered mediastinal lymph nodes which are felt to be  reactive/inflammatory in nature.  Esophagus appears within normal limits as seen.     HEART AND VESSELS:  The thoracic aorta is of normal course and caliber without vascular  calcifications.  Main pulmonary artery and its branches are normal in caliber.  No coronary artery calcifications are seen.The study is not optimized  for evaluation of coronary arteries.  The cardiac chambers are not enlarged.  No evidence of pericardial effusion.    Assessment/Plan   Diagnoses and all orders for this visit:  Chronic diastolic heart failure  Hypertension, unspecified type  Primary hypertension  -     ECG 12 lead (Clinic Performed)  -     CBC; Future  Dyslipidemia  -     Lipid Panel; Future  -     Comprehensive Metabolic Panel; Future  Class 2 obesity without serious comorbidity with body mass index (BMI) of 38.0 to 38.9 in adult, unspecified obesity type  -     Hemoglobin A1C; Future    In summary Ms. Weeks is a pleasant 58 year-old white female with a past medical history significant for hypertension, HFpEF, hyperlipidemia with a CT calcium score of 0 in 2019, obesity, obstructive sleep apnea, and prior tobacco use.  She is relatively asymptomatic from a cardiac perspective.  She appears euvolemic on exam.  I encouraged her to continue to work on losing weight and increasing her physical activity.  She should continue her current cardiovascular medications.  I ordered labs as indicated above.  We will see her back in follow-up in 6 months.    Orders:  Orders Placed This Encounter   Procedures     Lipid Panel    Comprehensive Metabolic Panel    CBC    Hemoglobin A1C    ECG 12 lead (Clinic Performed)      Followup Appts:  Future Appointments   Date Time Provider Department Center   5/7/2025  1:00 PM Lucia Anderson, APRN-CNP [id number]             ____________________________________________________________  [facility] [place]  [facility]

## 2024-11-07 ENCOUNTER — LAB (OUTPATIENT)
Dept: LAB | Facility: LAB | Age: 59
End: 2024-11-07
Payer: COMMERCIAL

## 2024-11-07 DIAGNOSIS — I10 PRIMARY HYPERTENSION: ICD-10-CM

## 2024-11-07 DIAGNOSIS — E78.5 DYSLIPIDEMIA: ICD-10-CM

## 2024-11-07 DIAGNOSIS — E66.812 CLASS 2 OBESITY WITHOUT SERIOUS COMORBIDITY WITH BODY MASS INDEX (BMI) OF 38.0 TO 38.9 IN ADULT, UNSPECIFIED OBESITY TYPE: ICD-10-CM

## 2024-11-07 LAB
ALBUMIN SERPL BCP-MCNC: 4.5 G/DL (ref 3.4–5)
ALP SERPL-CCNC: 90 U/L (ref 33–136)
ALT SERPL W P-5'-P-CCNC: 20 U/L (ref 7–45)
ANION GAP SERPL CALC-SCNC: 10 MMOL/L (ref 10–20)
AST SERPL W P-5'-P-CCNC: 14 U/L (ref 9–39)
BILIRUB SERPL-MCNC: 0.7 MG/DL (ref 0–1.2)
BUN SERPL-MCNC: 19 MG/DL (ref 6–23)
CALCIUM SERPL-MCNC: 9.8 MG/DL (ref 8.6–10.3)
CHLORIDE SERPL-SCNC: 106 MMOL/L (ref 98–107)
CHOLEST SERPL-MCNC: 144 MG/DL (ref 0–199)
CHOLESTEROL/HDL RATIO: 3.1
CO2 SERPL-SCNC: 27 MMOL/L (ref 21–32)
CREAT SERPL-MCNC: 0.91 MG/DL (ref 0.5–1.05)
EGFRCR SERPLBLD CKD-EPI 2021: 72 ML/MIN/1.73M*2
ERYTHROCYTE [DISTWIDTH] IN BLOOD BY AUTOMATED COUNT: 12.7 % (ref 11.5–14.5)
EST. AVERAGE GLUCOSE BLD GHB EST-MCNC: 117 MG/DL
GLUCOSE SERPL-MCNC: 113 MG/DL (ref 74–99)
HBA1C MFR BLD: 5.7 %
HCT VFR BLD AUTO: 41.7 % (ref 36–46)
HDLC SERPL-MCNC: 46.2 MG/DL
HGB BLD-MCNC: 13.8 G/DL (ref 12–16)
LDLC SERPL CALC-MCNC: 68 MG/DL
MCH RBC QN AUTO: 31.3 PG (ref 26–34)
MCHC RBC AUTO-ENTMCNC: 33.1 G/DL (ref 32–36)
MCV RBC AUTO: 95 FL (ref 80–100)
NON HDL CHOLESTEROL: 98 MG/DL (ref 0–149)
NRBC BLD-RTO: 0 /100 WBCS (ref 0–0)
PLATELET # BLD AUTO: 239 X10*3/UL (ref 150–450)
POTASSIUM SERPL-SCNC: 4.4 MMOL/L (ref 3.5–5.3)
PROT SERPL-MCNC: 6.7 G/DL (ref 6.4–8.2)
RBC # BLD AUTO: 4.41 X10*6/UL (ref 4–5.2)
SODIUM SERPL-SCNC: 139 MMOL/L (ref 136–145)
TRIGL SERPL-MCNC: 149 MG/DL (ref 0–149)
VLDL: 30 MG/DL (ref 0–40)
WBC # BLD AUTO: 6.8 X10*3/UL (ref 4.4–11.3)

## 2024-11-07 PROCEDURE — 80061 LIPID PANEL: CPT

## 2024-11-07 PROCEDURE — 36415 COLL VENOUS BLD VENIPUNCTURE: CPT

## 2024-11-07 PROCEDURE — 85027 COMPLETE CBC AUTOMATED: CPT

## 2024-11-07 PROCEDURE — 80053 COMPREHEN METABOLIC PANEL: CPT

## 2024-11-07 PROCEDURE — 83036 HEMOGLOBIN GLYCOSYLATED A1C: CPT

## 2024-11-20 DIAGNOSIS — F17.210 NICOTINE DEPENDENCE, CIGARETTES, UNCOMPLICATED: Primary | ICD-10-CM

## 2024-11-21 DIAGNOSIS — I10 HYPERTENSION, UNSPECIFIED TYPE: ICD-10-CM

## 2024-11-22 RX ORDER — ATORVASTATIN CALCIUM 10 MG/1
TABLET, FILM COATED ORAL
Qty: 90 TABLET | Refills: 1 | Status: SHIPPED | OUTPATIENT
Start: 2024-11-22

## 2024-12-06 ENCOUNTER — APPOINTMENT (OUTPATIENT)
Dept: RESPIRATORY THERAPY | Facility: CLINIC | Age: 59
End: 2024-12-06
Payer: COMMERCIAL

## 2024-12-11 ENCOUNTER — HOSPITAL ENCOUNTER (OUTPATIENT)
Dept: RESPIRATORY THERAPY | Facility: CLINIC | Age: 59
Discharge: HOME | End: 2024-12-11
Payer: COMMERCIAL

## 2024-12-11 DIAGNOSIS — I10 HYPERTENSION, UNSPECIFIED TYPE: ICD-10-CM

## 2024-12-11 DIAGNOSIS — R06.09 DOE (DYSPNEA ON EXERTION): ICD-10-CM

## 2024-12-11 PROCEDURE — 94618 PULMONARY STRESS TESTING: CPT | Performed by: INTERNAL MEDICINE

## 2024-12-11 PROCEDURE — 94729 DIFFUSING CAPACITY: CPT | Performed by: INTERNAL MEDICINE

## 2024-12-11 PROCEDURE — 94618 PULMONARY STRESS TESTING: CPT

## 2024-12-11 PROCEDURE — 94664 DEMO&/EVAL PT USE INHALER: CPT

## 2024-12-11 PROCEDURE — 94727 GAS DIL/WSHOT DETER LNG VOL: CPT | Performed by: INTERNAL MEDICINE

## 2024-12-11 PROCEDURE — 94060 EVALUATION OF WHEEZING: CPT

## 2024-12-11 PROCEDURE — 94760 N-INVAS EAR/PLS OXIMETRY 1: CPT

## 2024-12-11 PROCEDURE — 94729 DIFFUSING CAPACITY: CPT

## 2024-12-11 PROCEDURE — 94727 GAS DIL/WSHOT DETER LNG VOL: CPT

## 2024-12-11 PROCEDURE — 94060 EVALUATION OF WHEEZING: CPT | Performed by: INTERNAL MEDICINE

## 2024-12-16 ENCOUNTER — DOCUMENTATION (OUTPATIENT)
Dept: PULMONOLOGY | Facility: HOSPITAL | Age: 59
End: 2024-12-16
Payer: COMMERCIAL

## 2024-12-16 NOTE — PROGRESS NOTES
Patient's PFTs reveal no obstruction or restriction with normal DLCO.  Her walk test was normal.  She is going to try and lose weight and get into better physical shape.

## 2024-12-19 ENCOUNTER — HOSPITAL ENCOUNTER (OUTPATIENT)
Dept: RADIOLOGY | Facility: HOSPITAL | Age: 59
Discharge: HOME | End: 2024-12-19
Payer: COMMERCIAL

## 2024-12-19 DIAGNOSIS — F17.210 NICOTINE DEPENDENCE, CIGARETTES, UNCOMPLICATED: ICD-10-CM

## 2024-12-19 PROCEDURE — 71271 CT THORAX LUNG CANCER SCR C-: CPT

## 2024-12-19 PROCEDURE — 71271 CT THORAX LUNG CANCER SCR C-: CPT | Performed by: RADIOLOGY

## 2025-01-03 DIAGNOSIS — I10 HYPERTENSION, UNSPECIFIED TYPE: ICD-10-CM

## 2025-01-03 RX ORDER — LOSARTAN POTASSIUM 50 MG/1
50 TABLET ORAL DAILY
Qty: 90 TABLET | Refills: 3 | Status: SHIPPED | OUTPATIENT
Start: 2025-01-03

## 2025-01-26 DIAGNOSIS — F41.9 ANXIETY: ICD-10-CM

## 2025-01-27 RX ORDER — CITALOPRAM 40 MG/1
40 TABLET, FILM COATED ORAL DAILY
Qty: 30 TABLET | Refills: 0 | Status: SHIPPED | OUTPATIENT
Start: 2025-01-27

## 2025-02-12 NOTE — PROGRESS NOTES
Subjective      Chief Complaint   Patient presents with    Left Elbow - Pain        Past Surgical History:   Procedure Laterality Date    CERVICAL FUSION  08/02/2013    Cervical Vertebral Fusion    LUMBAR LAMINECTOMY  08/02/2013    Laminectomy Lumbar    OTHER SURGICAL HISTORY  08/02/2013    Oophorectomy - Bilateral (Removal Of Both Ovaries)        Past Medical History:   Diagnosis Date    Atypical squamous cells of undetermined significance on cytologic smear of cervix (ASC-US) 08/26/2016    ASCUS with positive high risk HPV cervical    Carpal tunnel syndrome, right upper limb 10/29/2017    Carpal tunnel syndrome of right wrist    Cervical high risk human papillomavirus (HPV) DNA test positive 06/01/2015    Cervical high risk HPV (human papillomavirus) test positive    Chronic obstructive pulmonary disease with (acute) exacerbation (Multi) 09/21/2016    COPD exacerbation    Noninfective gastroenteritis and colitis, unspecified 12/29/2016    Acute gastroenteritis    Nontoxic single thyroid nodule 09/28/2020    Solitary thyroid nodule    Osteoarthritis of knee, unspecified 04/05/2022    Osteoarthritis of knee    Other abnormal cytological findings on specimens from cervix uteri 12/19/2014    Unexplained endometrial cells on cervical Pap smear    Other muscle spasm 03/31/2016    Cervical paraspinous muscle spasm    Other muscle spasm 03/31/2016    Trapezius muscle spasm    Pain in right hand 09/07/2017    Pain of right hand    Personal history of colonic polyps 07/27/2016    History of adenomatous polyp of colon    Personal history of other diseases of the respiratory system 06/25/2014    History of acute pharyngitis    Personal history of other diseases of the respiratory system 03/17/2014    History of acute bronchitis    Personal history of other diseases of the respiratory system 03/17/2014    History of acute sinusitis    Personal history of other endocrine, nutritional and metabolic disease 07/19/2022    History  of diabetes mellitus    Personal history of other specified conditions 09/21/2016    History of wheezing    Personal history of other specified conditions 12/28/2020    History of weight gain    Personal history of other specified conditions 08/02/2013    History of abdominal pain    Radiculopathy, cervical region 03/31/2016    Cervical radiculopathy, acute    Unspecified acute lower respiratory infection 06/29/2017    Acute lower respiratory infection        HPI  This 59 year old patient presents today with left elbow pain (8/10). The patient states that the left elbow pain has been present for months. The patient denies trauma or injury to the left elbow. The patient states that the left elbow  pain is worse with and aggravated by reaching and lifting and also doing aquatic exercises. The patient states that this left elbow pain is disabling and presents today to discuss further options. The patient states that they have tried tylenol and ibuprofen with some relief.    No Known Allergies     Family History   Problem Relation Name Age of Onset    Frontotemporal dementia Mother      Other (CARDIAC DISORDER) Father          Social History     Socioeconomic History    Marital status:      Spouse name: Not on file    Number of children: Not on file    Years of education: Not on file    Highest education level: Not on file   Occupational History    Not on file   Tobacco Use    Smoking status: Former     Current packs/day: 1.00     Average packs/day: 1 pack/day for 10.0 years (10.0 ttl pk-yrs)     Types: Cigarettes    Smokeless tobacco: Former   Substance and Sexual Activity    Alcohol use: Yes     Alcohol/week: 1.0 standard drink of alcohol     Types: 1 Standard drinks or equivalent per week    Drug use: Never    Sexual activity: Not on file   Other Topics Concern    Not on file   Social History Narrative    Not on file     Social Drivers of Health     Financial Resource Strain: Not on file   Food Insecurity:  Not on file   Transportation Needs: Not on file   Physical Activity: Not on file   Stress: Not on file   Social Connections: Not on file   Intimate Partner Violence: Not on file   Housing Stability: Not on file        ROS  CARDIOLOGY:   Negative for chest pain, shortness of breath.   RESPIRATORY:   Negative for chest pain, shortness of breath.   MUSCULOSKELETAL:   See HPI for details.   NEUROLOGY:   Negative for tingling, numbness, weakness.    Body mass index is 38.37 kg/m².     Physical examination  GENERAL:          General Appearance:  This is a pleasant patient with appropriate affect, in no acute distress.   DERMATOLOGY:          Skin: skin at the neck, upper and lower back, and trunk is intact. There is no evidence of skin rash, skin breakdown or ulceration, or atrophic skin change.   EXTREMITIES:          Vascular:  Right, left hands and feet are warm with good color and pulses. Right and left calf and thigh are nontender and nonswollen.   NEUROLOGICAL:          Orientation:  Patient is alert and oriented to person, place, time and situation. Right and left upper and lower extremity motor and sensory examinations are intact.  MUSCULOSKELETAL: left elbow: There is tenderness at the lateral epicondyle. There is tenderness and pain with forced left wrist extension versus resistance at the left lateral epicondyle. there is full ROM. Neurovascular is intact. Compartments are soft. Radial pulse is intact.    AP and lateral x-rays of the left elbow show mild spurring at the medial and lateral epicondyles and also at the olecranon.     Patient ID: Shayy Weeks is a 59 y.o. female.    M Inj/Asp: L elbow on 2/13/2025 3:31 PM  Indications: pain  Details: 22 G needle, lateral approach  Medications: 1 mL lidocaine 10 mg/mL (1 %); 10 mg triamcinolone acetonide 40 mg/mL  Outcome: tolerated well, no immediate complications  Procedure, treatment alternatives, risks and benefits explained, specific risks discussed.  Immediately prior to procedure a time out was called to verify the correct patient, procedure, equipment, support staff and site/side marked as required. Patient was prepped and draped in the usual sterile fashion.           Shayy was seen today for pain.  Diagnoses and all orders for this visit:  Left elbow pain (Primary)  Left lateral epicondylitis     Options are discussed with the patient in detail. We discussed cortisone injection in office today, and the patient is agreeable to this in office today. See procedures above. The patient is instructed regarding activity modification and risk for further injury with falling or trauma and to use an arm band as needed, ice, provider directed at home gentle strengthening and ROM exercises, and the appropriate use of Tylenol as needed for pain with its potential adverse reactions and side effects. The patient understands. Follow up as needed. Please note that this report has been produced using speech recognition software.  It may contain errors related to grammar, punctuation or spelling.  Electronically signed, but not reviewed.     Callie Mcgee PA-C

## 2025-02-13 ENCOUNTER — OFFICE VISIT (OUTPATIENT)
Dept: ORTHOPEDIC SURGERY | Facility: CLINIC | Age: 60
End: 2025-02-13
Payer: COMMERCIAL

## 2025-02-13 ENCOUNTER — HOSPITAL ENCOUNTER (OUTPATIENT)
Dept: RADIOLOGY | Facility: CLINIC | Age: 60
Discharge: HOME | End: 2025-02-13
Payer: COMMERCIAL

## 2025-02-13 VITALS — BODY MASS INDEX: 38.45 KG/M2 | WEIGHT: 245 LBS | HEIGHT: 67 IN

## 2025-02-13 DIAGNOSIS — M77.12 LEFT LATERAL EPICONDYLITIS: ICD-10-CM

## 2025-02-13 DIAGNOSIS — M25.522 LEFT ELBOW PAIN: Primary | ICD-10-CM

## 2025-02-13 DIAGNOSIS — R52 PAIN: ICD-10-CM

## 2025-02-13 PROCEDURE — 99203 OFFICE O/P NEW LOW 30 MIN: CPT | Performed by: PHYSICIAN ASSISTANT

## 2025-02-13 PROCEDURE — 73060 X-RAY EXAM OF HUMERUS: CPT | Mod: LT

## 2025-02-13 PROCEDURE — 3008F BODY MASS INDEX DOCD: CPT | Performed by: PHYSICIAN ASSISTANT

## 2025-02-13 PROCEDURE — 2500000004 HC RX 250 GENERAL PHARMACY W/ HCPCS (ALT 636 FOR OP/ED): Performed by: PHYSICIAN ASSISTANT

## 2025-02-13 PROCEDURE — 99213 OFFICE O/P EST LOW 20 MIN: CPT | Mod: 25 | Performed by: PHYSICIAN ASSISTANT

## 2025-02-13 PROCEDURE — 20605 DRAIN/INJ JOINT/BURSA W/O US: CPT | Mod: LT | Performed by: PHYSICIAN ASSISTANT

## 2025-02-13 PROCEDURE — 1036F TOBACCO NON-USER: CPT | Performed by: PHYSICIAN ASSISTANT

## 2025-02-13 RX ORDER — TRIAMCINOLONE ACETONIDE 40 MG/ML
10 INJECTION, SUSPENSION INTRA-ARTICULAR; INTRAMUSCULAR
Status: COMPLETED | OUTPATIENT
Start: 2025-02-13 | End: 2025-02-13

## 2025-02-13 RX ORDER — LIDOCAINE HYDROCHLORIDE 10 MG/ML
1 INJECTION, SOLUTION INFILTRATION; PERINEURAL
Status: COMPLETED | OUTPATIENT
Start: 2025-02-13 | End: 2025-02-13

## 2025-02-13 RX ADMIN — LIDOCAINE HYDROCHLORIDE 1 ML: 10 INJECTION, SOLUTION INFILTRATION; PERINEURAL at 15:31

## 2025-02-13 RX ADMIN — TRIAMCINOLONE ACETONIDE 10 MG: 40 INJECTION, SUSPENSION INTRA-ARTICULAR; INTRAMUSCULAR at 15:31

## 2025-02-13 ASSESSMENT — PATIENT HEALTH QUESTIONNAIRE - PHQ9
2. FEELING DOWN, DEPRESSED OR HOPELESS: NOT AT ALL
1. LITTLE INTEREST OR PLEASURE IN DOING THINGS: NOT AT ALL
SUM OF ALL RESPONSES TO PHQ9 QUESTIONS 1 AND 2: 0

## 2025-02-13 ASSESSMENT — COLUMBIA-SUICIDE SEVERITY RATING SCALE - C-SSRS
2. HAVE YOU ACTUALLY HAD ANY THOUGHTS OF KILLING YOURSELF?: NO
1. IN THE PAST MONTH, HAVE YOU WISHED YOU WERE DEAD OR WISHED YOU COULD GO TO SLEEP AND NOT WAKE UP?: NO
6. HAVE YOU EVER DONE ANYTHING, STARTED TO DO ANYTHING, OR PREPARED TO DO ANYTHING TO END YOUR LIFE?: NO

## 2025-02-13 ASSESSMENT — PAIN SCALES - GENERAL
PAINLEVEL_OUTOF10: 5 - MODERATE PAIN
PAINLEVEL_OUTOF10: 5

## 2025-02-13 ASSESSMENT — ENCOUNTER SYMPTOMS
OCCASIONAL FEELINGS OF UNSTEADINESS: 0
DEPRESSION: 0
LOSS OF SENSATION IN FEET: 0

## 2025-02-13 ASSESSMENT — LIFESTYLE VARIABLES
HOW OFTEN DO YOU HAVE A DRINK CONTAINING ALCOHOL: NEVER
HOW OFTEN DO YOU HAVE SIX OR MORE DRINKS ON ONE OCCASION: NEVER

## 2025-02-13 ASSESSMENT — PAIN - FUNCTIONAL ASSESSMENT: PAIN_FUNCTIONAL_ASSESSMENT: 0-10

## 2025-02-13 NOTE — PATIENT INSTRUCTIONS
Thank you for coming to see us today!     Continue to use tylenol for pain control.   Rest, ice and elevate and remember to do exercises.   Wear a tennis elbow arm band    Follow up  as needed

## 2025-02-23 DIAGNOSIS — F41.9 ANXIETY: ICD-10-CM

## 2025-02-24 RX ORDER — CITALOPRAM 40 MG/1
40 TABLET, FILM COATED ORAL DAILY
Qty: 90 TABLET | Refills: 1 | Status: SHIPPED | OUTPATIENT
Start: 2025-02-24

## 2025-03-14 ENCOUNTER — HOSPITAL ENCOUNTER (OUTPATIENT)
Dept: RADIOLOGY | Facility: CLINIC | Age: 60
Discharge: HOME | End: 2025-03-14
Payer: COMMERCIAL

## 2025-03-14 ENCOUNTER — OFFICE VISIT (OUTPATIENT)
Dept: ORTHOPEDIC SURGERY | Facility: CLINIC | Age: 60
End: 2025-03-14
Payer: COMMERCIAL

## 2025-03-14 DIAGNOSIS — M25.562 LEFT KNEE PAIN, UNSPECIFIED CHRONICITY: ICD-10-CM

## 2025-03-14 DIAGNOSIS — M17.11 ARTHRITIS OF KNEE, RIGHT: Primary | ICD-10-CM

## 2025-03-14 DIAGNOSIS — M25.561 RIGHT KNEE PAIN, UNSPECIFIED CHRONICITY: ICD-10-CM

## 2025-03-14 DIAGNOSIS — S76.311A HAMSTRING STRAIN, RIGHT, INITIAL ENCOUNTER: ICD-10-CM

## 2025-03-14 DIAGNOSIS — M21.161 ACQUIRED GENU VARUM, RIGHT: ICD-10-CM

## 2025-03-14 PROCEDURE — L1830 KO IMMOB CANVAS LONG PRE OTS: HCPCS | Performed by: ORTHOPAEDIC SURGERY

## 2025-03-14 PROCEDURE — 20610 DRAIN/INJ JOINT/BURSA W/O US: CPT | Performed by: ORTHOPAEDIC SURGERY

## 2025-03-14 PROCEDURE — 99214 OFFICE O/P EST MOD 30 MIN: CPT | Performed by: ORTHOPAEDIC SURGERY

## 2025-03-14 PROCEDURE — 73562 X-RAY EXAM OF KNEE 3: CPT | Mod: RT

## 2025-03-14 RX ORDER — LIDOCAINE HYDROCHLORIDE 10 MG/ML
0.5 INJECTION, SOLUTION INFILTRATION; PERINEURAL
Status: COMPLETED | OUTPATIENT
Start: 2025-03-14 | End: 2025-03-14

## 2025-03-14 RX ORDER — TRIAMCINOLONE ACETONIDE 40 MG/ML
2.5 INJECTION, SUSPENSION INTRA-ARTICULAR; INTRAMUSCULAR
Status: COMPLETED | OUTPATIENT
Start: 2025-03-14 | End: 2025-03-14

## 2025-03-14 RX ADMIN — TRIAMCINOLONE ACETONIDE 2.5 MG: 40 INJECTION, SUSPENSION INTRA-ARTICULAR; INTRAMUSCULAR at 13:48

## 2025-03-14 RX ADMIN — LIDOCAINE HYDROCHLORIDE 0.5 ML: 10 INJECTION, SOLUTION INFILTRATION; PERINEURAL at 13:48

## 2025-03-14 NOTE — PROGRESS NOTES
HISTORY OF PRESENT ILLNESS  This is a pleasant 59 y.o. year old female  who presents on 03/14/2025 at the request of Aubrie Dewey MD for evaluation of right knee pain that has been present over/since few weeks.    How the condition occurred or started: sitting with knee flexed for awhile reading and then got up and felt rip in posterior knee, not able to put pressure on it and using crutches, hard to get straight  Location of pain (patient points to): back of knee and hamstrings area  Quality of pain: Moderate  Modifying Factors: worse with walking and standing  Associated Signs and symptoms: swelled  Previous Treatment: crutches, nsaids  PSH: Left knee doing very well, no pain    PHYSICAL EXAMINATION  Constitutional Exam: patient's height and weight reviewed, well-kempt  Psychiatric Exam: alert and oriented x 3, appropriate mood and behavior  Eye Exam: NIKITA, EOMI  Pulmonary Exam: breathing non-labored, no apparent distress  Lymphatic exam: no appreciable lymphadenopathy in the lower extremities  Cardiovascular exam: DP pulses 2+ bilaterally, PT 2+ bilaterally, toes are pink with good capillary refill, no pitting edema  Skin exam: no open lesions, rashes, abrasions or ulcerations  Neurological exam: sensation to light touch intact in both lower extremities in peripheral and dermatomal distributions (except for any abnormalities noted in musculoskeletal exam)    Musculoskeletal exam: right knee: not able to get fully straight due to pain and hamstring spasm (about 10 degrees-15 degrees off), stable ligamentous exam, pain over lateral knee joint line and posterior knee, able to flex to 90 degrees, patellar crepitus    DATA/RESULTS REVIEW: I personally reviewed the patient's x-ray images and reports of the right knee showing tricompartmental arthritis worse in medial and patellar compartments, no acute findings or fractures.     ASSESSMENT: right knee osteoarthritis flare-up and likely further degenerative  tearing of lateral meniscus  PLAN: I discussed with the patient the differential diagnosis, complex arthritis related and body position related nature of the condition(s) and available treatment option(s).  Discussed use of knee immobilizer at night to prevent recurrent hamstring tightness.  PT orders and protocol written out for patient and therapist, can wean crutches as gait improves.  Use knee immobilizer until good leg control.  Discussed risks and benefits of injection to help with the pain, patient would like to try.  FUV in a few months or if further injections needed then gave her referral to our excellent nonoperative knee arthritis physicians.  Discussed with her that degenerative meniscal tears in setting of tricompartmental arthrosis with grade 4 changes we typically do not recommend knee arthroscopic treatment as results are not predictable or favorable as we cannot reverse the arthritis with a scope.  The patient's questions were answered in detail.      Patient ID: Shayy Weeks is a 59 y.o. female.    L Inj/Asp: R knee on 3/14/2025 1:48 PM  Indications: pain  Details: 22 G needle, anterolateral approach  Medications: 2.5 mg triamcinolone acetonide 40 mg/mL; 0.5 mL lidocaine 10 mg/mL (1 %)  Outcome: tolerated well, no immediate complications    PROCEDURE NOTE  I reviewed the risks and benefits of right knee injection with the patient.  Risks including pain, bleeding, infection, hypopigmentation of the skin, loss of subcutaneous fat, metabolic complications and possibility that further injections may not be effective.  The patient gave informed consent for the procedure.      A time-out was called and confirmed.  Sterile preparation was made over the joint line and 4 cc of 1% lidocaine and 1cc of kenalog 40 was then injected into the knee without complications.  A sterile dressing was then applied.  The patient was given post-procedure instructions and precautions.  I personally performed the  "procedure.      Procedure, treatment alternatives, risks and benefits explained, specific risks discussed. Consent was given by the patient. Immediately prior to procedure a time out was called to verify the correct patient, procedure, equipment, support staff and site/side marked as required. Patient was prepped and draped in the usual sterile fashion.           Note dictated with MYOS software, completed without full type editing to avoid delay.      Dear Referring Physician,  It was my pleasure to see your patient, in the office today.  Please refer to the detailed notes above for my findings and recommendations.  Thank you once again for your consultation request.  If you have any further questions or concerns, please feel free to contact me via email or at the phone number and address below.  Sincerely,    Fadumo Walton MD   of Orthopedic Surgery, [employer]  Dual Fellowship-trained in Orthopedic Sports Medicine (Knee and Shoulder), and Foot and Ankle Surgery  [facility] [place]   [employer] Subspecialty Certificate in Orthopedic Sports Medicine  Head Team Physician Case \"[place]\" and [facility] \"[facility]\"  Team [employer] OP Physician [id number] Paralympic Games  Team [employer] US Figure Skating Medical Practitioner, including International Event Coverage  Director, Foot and Ankle Division, Department of Orthopedics    [address], [place] [address]  [email or web address]  Office [phone]    "

## 2025-03-19 ENCOUNTER — EVALUATION (OUTPATIENT)
Dept: PHYSICAL THERAPY | Facility: CLINIC | Age: 60
End: 2025-03-19
Payer: COMMERCIAL

## 2025-03-19 DIAGNOSIS — S76.311A HAMSTRING STRAIN, RIGHT, INITIAL ENCOUNTER: ICD-10-CM

## 2025-03-19 DIAGNOSIS — M17.11 ARTHRITIS OF KNEE, RIGHT: ICD-10-CM

## 2025-03-19 DIAGNOSIS — M25.561 ACUTE PAIN OF RIGHT KNEE: Primary | ICD-10-CM

## 2025-03-19 DIAGNOSIS — M21.161 ACQUIRED GENU VARUM, RIGHT: ICD-10-CM

## 2025-03-19 PROCEDURE — 97161 PT EVAL LOW COMPLEX 20 MIN: CPT | Mod: GP | Performed by: PHYSICAL THERAPIST

## 2025-03-19 SDOH — ECONOMIC STABILITY: GENERAL: QUALITY OF LIFE: GOOD

## 2025-03-19 ASSESSMENT — ENCOUNTER SYMPTOMS
QUALITY: DISCOMFORT
PAIN SCALE AT LOWEST: 0
PAIN SCALE AT HIGHEST: 5
PAIN SCALE: 0
PAIN LOCATION: R KNEE

## 2025-03-19 ASSESSMENT — ACTIVITIES OF DAILY LIVING (ADL): POOR_BALANCE: 1

## 2025-03-19 NOTE — PROGRESS NOTES
"Physical Therapy Evaluation and Treatment     Patient Name: Shayy Weeks  MRN: [id number]  Encounter date: 3/19/2025  Time Calculation  Start Time: 1400  Stop Time: 1435  Time Calculation (min): 35 min  PT Evaluation Time Entry  PT Evaluation (Low) Time Entry: 35  Low complexity due to patient's clinical presentation being stable and uncomplicated by any significant comorbidities that may affect rehab tolerance and progression.     Visit # 1 of 7  Visits/Dates Authorized: NO AUTH / $30 copay per visit / $5000 OOP not met / MN visits - MN review after 25th visit   Insurance Type: Payor: [employer] / Plan: [employer] / Product Type: *No Product type* /     Current Problem:   Problem List Items Addressed This Visit             ICD-10-CM    Arthritis of knee, right M17.11    Relevant Orders    Follow Up In Physical Therapy    Knee pain - Primary M25.569    Relevant Orders    Follow Up In Physical Therapy     Other Visit Diagnoses         Codes    Acquired genu varum, right     M21.161    Relevant Orders    Follow Up In Physical Therapy    Hamstring strain, right, initial encounter     S76.311A    Relevant Orders    Follow Up In Physical Therapy          Precautions:  Precautions  Precautions Comment: \"PT orders and protocol written out for patient and therapist, can wean crutches as gait improves.  Use knee immobilizer until good leg control.\" - MD instructures       Subjective    Subjective Evaluation    History of Present Illness  Mechanism of injury: Pt presents to therapy with R knee pain, she has a history of arthritis. She had surgery on her L  knee in 2021 arthroscopic and feels the R knee got weak. She was sitting and had her R knee in figure 4 position on the bed went to get up and felt a rip sensation. She was unable to put any pressure down through the knee but did notice that if she stood in a squat type position the knee felt better. She went to see MD had an injection and was " told to come to rehab. Surgery does not appear to be in the picture right now and would like to get better without need for surgery. She was told to wear a brace to keep the leg straight for HS at night. She doesn't wear a brace in the home but will have a brace when she is out and about. She does not report any sensation of the knee giving out on her just irritated.     Quality of life: good    Pain  Current pain ratin  At best pain ratin  At worst pain ratin  Location: R knee  Quality: discomfort    Social Support  Lives in: one-story house (laundry in the basement - bilateral hand rail)  Lives with: adult children    Diagnostic Tests  X-ray: abnormal    Treatments  Previous treatment: physical therapy  Current treatment: injection treatment  Patient Goals  Patient goals for therapy: decreased pain, increased strength and increased motion           Objective      Objective     Active Range of Motion     Right Knee   Flexion: 95 degrees with pain  Extensor lag: 10 degrees with pain    Patellar Mobility     Right Knee Hypomobile in the medial, lateral, superior and inferior patellar tendon(s).     Additional Patellar Mobility Details  R knee joint hypomobile     Strength/Myotome Testing     Right Knee   Flexion: 5  Extension: 5  Quadriceps contraction: good    Tests     Right Knee   Negative anterior drawer, lateral Leonel, medial Leonel, posterior drawer, valgus stress test at 0 degrees and varus stress test at 0 degrees.     Ambulation     Ambulation: Stairs   Ascend stairs: independent  Pattern: reciprocal  Railings: two rails  Descend stairs: independent  Pattern: reciprocal  Railings: two rails    Observational Gait   Gait: within functional limits     Additional Observational Gait Details  Reduced TKE B       Outcome Measures:  Other Measures  Lower Extremity Funtional Score (LEFS): 24     Treatments:  Therapeutic Activity  Therapeutic Activity Performed: Yes  Therapeutic Activity 1: Educated  on anatomy in relation to findings  Therapeutic Activity 2: Instructed on use of brace    HEP / Access Codes:   Access Code: [email or web address]  URL: [email or web address]  Date: 03/19/2025  Prepared by: Wendy Smith    Exercises  - Seated Long Arc Quad  - 1 x daily - 7 x weekly - 3 sets - 10 reps  - Seated Hamstring Stretch  - 1 x daily - 7 x weekly - 1 sets - 3 reps - 20-30 seconds hold  - Sit to Stand  - 1 x daily - 7 x weekly - 3 sets - 10 reps  - Seated Hamstring Set  - 1 x daily - 7 x weekly - 3 sets - 10 reps    Assessment   Assessment & Plan     Assessment  Impairments: abnormal gait, abnormal or restricted ROM, activity intolerance, impaired balance, impaired physical strength, lacks appropriate home exercise program and pain with function  Assessment details: Pt is a 60 y.o female presenting to therapy with R knee pain. Pt demonstrated reduction in R knee mobility in both AROM and PROM. Noted hypomobility of knee and patella however pt reported this was not an abnormal finding for her, overall arthritis is probably creating limitations. Good strength was noted in quad and hamstring this date. Overall pt demonstrated reduction in strength/stability of R knee with OA findings. At this time pt would benefit from skilled physical therapy in order to prevent further functional decline.    Prognosis: good    Plan  Therapy options: will be seen for skilled physical therapy services  Planned modality interventions: TENS and electrical stimulation/Russian stimulation  Other planned modality interventions: KT Tape/Dry Needling/Cupping  Planned therapy interventions: abdominal trunk stabilization, manual therapy, motor coordination training, balance/weight-bearing training, neuromuscular re-education, body mechanics training, postural training, fine motor coordination training, soft tissue mobilization, flexibility, spinal/joint mobilization, functional ROM exercises, strengthening, gait training, stretching,  therapeutic activities, home exercise program, transfer training and joint mobilization  Frequency: 1x week  Duration in visits: 6  Duration in weeks: 7  Treatment plan discussed with: patient           Goals:   Active       PT Problem       PT Goal 1       Start:  03/19/25    Expected End:  06/07/25       Pt will be 100% IND with HEP in 6 weeks in order to maintain progress with therapy.   Pt will reduce pain levels to no more than 0/10 in 6 weeks in order to improve sleep, ambulation and work tasks.  Pt will be able to perform community ambulation demonstrating normalized dariusz without need of brace in 6 weeks for community needs.  Pt will be able to perform ascending/descending reciprocal pattern of 12 steps in 6 weeks for home navigation for laundry.  Pt will demonstrate subjective improvement of ADLs and recreational activities through improved score of 60 on LEFS in 6 weeks.

## 2025-03-24 ENCOUNTER — TREATMENT (OUTPATIENT)
Dept: PHYSICAL THERAPY | Facility: CLINIC | Age: 60
End: 2025-03-24
Payer: COMMERCIAL

## 2025-03-24 DIAGNOSIS — M21.161 ACQUIRED GENU VARUM, RIGHT: ICD-10-CM

## 2025-03-24 DIAGNOSIS — S76.311A HAMSTRING STRAIN, RIGHT, INITIAL ENCOUNTER: ICD-10-CM

## 2025-03-24 DIAGNOSIS — M17.11 ARTHRITIS OF KNEE, RIGHT: ICD-10-CM

## 2025-03-24 DIAGNOSIS — M25.561 ACUTE PAIN OF RIGHT KNEE: ICD-10-CM

## 2025-03-24 PROCEDURE — 97110 THERAPEUTIC EXERCISES: CPT | Mod: GP | Performed by: PHYSICAL THERAPIST

## 2025-03-24 NOTE — PROGRESS NOTES
"Physical Therapy Treatment    Patient Name: Shayy Weeks  MRN: [id number]  Today's Date: 3/24/2025  Time Calculation  Start Time: 0751  Stop Time: 0834  Time Calculation (min): 43 min  PT Therapeutic Procedures Time Entry  Therapeutic Exercise Time Entry: 43    Insurance:  Visit number: 2 of 7  Authorization info: NO AUTH / $30 copay per visit / $5000 OOP not met / MN visits - MN review after 25th visit   Insurance Type: Payor: [employer] / Plan: [employer] / Product Type: *No Product type* /     Current Problem   1. Arthritis of knee, right  Follow Up In Physical Therapy      2. Acquired genu varum, right  Follow Up In Physical Therapy      3. Hamstring strain, right, initial encounter  Follow Up In Physical Therapy      4. Acute pain of right knee  Follow Up In Physical Therapy          Subjective   General    Pt reports intermittent pain yesterday felt like her knee was grinding and than required icing to reduce the discomfort otherwise no complaints. HEP is going well without problem.   Precautions:   None   Pain    0  Post Treatment Pain Level 0    Objective   Lateral positioned patella     Treatments:  Therapeutic Exercise:   NuStep L1 5'   Gastroc stretch on wedge 30\"x3   HS stretch on stairs 30\"x3 B  TKE with purple ball 10x3   Retro towel slides 10x3 B   Standing hip abd pulses blue loop 10x3 B     KT tape applied along lateral patella/knee and across       Assessment   Assessment:    Pt tolerated session well focus on gentle hip/knee strengthening and use of KT tape for support. Will assess affects of KT tape next session.     Plan:    Continue with general knee strengthening, knee distraction    OP EDUCATION:   KT tape     Goals:   Active       PT Problem       PT Goal 1       Start:  03/19/25    Expected End:  06/07/25       Pt will be 100% IND with HEP in 6 weeks in order to maintain progress with therapy.   Pt will reduce pain levels to no more than 0/10 in 6 weeks in order " to improve sleep, ambulation and work tasks.  Pt will be able to perform community ambulation demonstrating normalized dariusz without need of brace in 6 weeks for community needs.  Pt will be able to perform ascending/descending reciprocal pattern of 12 steps in 6 weeks for home navigation for laundry.  Pt will demonstrate subjective improvement of ADLs and recreational activities through improved score of 60 on LEFS in 6 weeks.

## 2025-04-02 ENCOUNTER — TREATMENT (OUTPATIENT)
Dept: PHYSICAL THERAPY | Facility: CLINIC | Age: 60
End: 2025-04-02
Payer: COMMERCIAL

## 2025-04-02 DIAGNOSIS — M21.161 ACQUIRED GENU VARUM, RIGHT: ICD-10-CM

## 2025-04-02 DIAGNOSIS — S76.311A HAMSTRING STRAIN, RIGHT, INITIAL ENCOUNTER: ICD-10-CM

## 2025-04-02 DIAGNOSIS — M25.561 ACUTE PAIN OF RIGHT KNEE: ICD-10-CM

## 2025-04-02 DIAGNOSIS — M17.11 ARTHRITIS OF KNEE, RIGHT: ICD-10-CM

## 2025-04-02 PROCEDURE — 97110 THERAPEUTIC EXERCISES: CPT | Mod: GP,CQ

## 2025-04-02 PROCEDURE — 97140 MANUAL THERAPY 1/> REGIONS: CPT | Mod: GP,CQ

## 2025-04-02 NOTE — PROGRESS NOTES
Physical Therapy Treatment    Patient Name: Shayy Weeks  MRN: [id number]  Today's Date: 4/2/2025  Time Calculation  Start Time: 1455  Stop Time: 1535  Time Calculation (min): 40 min  PT Therapeutic Procedures Time Entry  Manual Therapy Time Entry: 15  Therapeutic Exercise Time Entry: 25    Insurance:  Visit number: 3 of 7  Authorization info: 2025: UMR - NO AUTH / $30 copay per visit / $5000 OOP not met / MN visits - MN review after 25th visit / ds 3/18/25.   Insurance Type: Payor: [employer] / Plan: [employer] / Product Type: *No Product type* /     Current Problem   1. Arthritis of knee, right  Follow Up In Physical Therapy      2. Acquired genu varum, right  Follow Up In Physical Therapy      3. Hamstring strain, right, initial encounter  Follow Up In Physical Therapy      4. Acute pain of right knee  Follow Up In Physical Therapy          Subjective   General    Pt feels that the taping didn't change things. Pt reports pain is more posteriolateral  verse anterior knee  Precautions:   None  Pain    0  Post Treatment Pain Level 0    Objective   Palpable tenderness along R distal bicep femoris tendon    Treatments:  Therapeutic Exercise:  Heel digs  Bridges off heels   Supine HS stretches with towel (sciatic nerve gliding)  Long sit HS stretch     Manual:   DN:  IDN performed this date. Pt educated on risks and benefits of dry needling. Pt provided verbal consent. All universal precautions followed.    5 - 30 mm distal HS     No adverse response. All IDN guidelines and safety protocols followed.      Assessment   Assessment:    Focused on R distal HS flexibility and overall HS strengthening    Plan:    Assess DN to R distal HS    OP EDUCATION:   Pt instructed to use more heat especially the rest of today.    Goals:   Active       PT Problem       PT Goal 1 (Progressing)       Start:  03/19/25    Expected End:  06/07/25       Pt will be 100% IND with HEP in 6 weeks in order to maintain  progress with therapy.   Pt will reduce pain levels to no more than 0/10 in 6 weeks in order to improve sleep, ambulation and work tasks.  Pt will be able to perform community ambulation demonstrating normalized dariusz without need of brace in 6 weeks for community needs.  Pt will be able to perform ascending/descending reciprocal pattern of 12 steps in 6 weeks for home navigation for laundry.  Pt will demonstrate subjective improvement of ADLs and recreational activities through improved score of 60 on LEFS in 6 weeks.

## 2025-04-07 ENCOUNTER — APPOINTMENT (OUTPATIENT)
Dept: PHYSICAL THERAPY | Facility: CLINIC | Age: 60
End: 2025-04-07
Payer: COMMERCIAL

## 2025-04-11 ENCOUNTER — TREATMENT (OUTPATIENT)
Dept: PHYSICAL THERAPY | Facility: CLINIC | Age: 60
End: 2025-04-11
Payer: COMMERCIAL

## 2025-04-11 DIAGNOSIS — M25.561 ACUTE PAIN OF RIGHT KNEE: ICD-10-CM

## 2025-04-11 DIAGNOSIS — M21.161 ACQUIRED GENU VARUM, RIGHT: ICD-10-CM

## 2025-04-11 DIAGNOSIS — S76.311A HAMSTRING STRAIN, RIGHT, INITIAL ENCOUNTER: ICD-10-CM

## 2025-04-11 DIAGNOSIS — M17.11 ARTHRITIS OF KNEE, RIGHT: ICD-10-CM

## 2025-04-11 PROCEDURE — 97110 THERAPEUTIC EXERCISES: CPT | Mod: GP,CQ

## 2025-04-11 NOTE — PROGRESS NOTES
"Physical Therapy Treatment    Patient Name: Shayy Weeks  MRN: [id number]  Today's Date: 4/11/2025  Time Calculation  Start Time: 1015  Stop Time: 1040  Time Calculation (min): 25 min  PT Therapeutic Procedures Time Entry  Therapeutic Exercise Time Entry: 15    Insurance:  Visit number: 4 of 7  Authorization info: 2025: UMR - NO AUTH / $30 copay per visit / $5000 OOP not met / MN visits - MN review after 25th visit / ds 3/18/25.   Insurance Type: Payor: [employer] / Plan: [employer] / Product Type: *No Product type* /     Current Problem   1. Arthritis of knee, right  Follow Up In Physical Therapy      2. Acquired genu varum, right  Follow Up In Physical Therapy      3. Hamstring strain, right, initial encounter  Follow Up In Physical Therapy      4. Acute pain of right knee  Follow Up In Physical Therapy          Subjective   General    Pt reports that she was on her feet for a couple days at a trade show.  Precautions:   None  Pain    Currently very little but it varies throughout the day  Post Treatment Pain Level none given    Objective   Difficult to palpate pain area. Pt     Treatments:  Therapeutic Exercise:  Reviewed HEP  Discussed at length with patient her sx's and what positions or movements elicit R posteriolateral pain (deep).   Assessment   Assessment:    Pt describes pain as a pinch or shifting deep in the joint. More frequent pain with knee flexion. Very nervous neotiating steps because she doesn't know when the knee may \"go out\".    Plan:   Pt will be going back to see MD for possible MRI. Pt will call once she knows when her MRI is scheduled.     OP EDUCATION:   Discussed HEP    Goals:   Active       PT Problem       PT Goal 1 (Progressing)       Start:  03/19/25    Expected End:  06/07/25       Pt will be 100% IND with HEP in 6 weeks in order to maintain progress with therapy.   Pt will reduce pain levels to no more than 0/10 in 6 weeks in order to improve sleep, " ambulation and work tasks.  Pt will be able to perform community ambulation demonstrating normalized dariusz without need of brace in 6 weeks for community needs.  Pt will be able to perform ascending/descending reciprocal pattern of 12 steps in 6 weeks for home navigation for laundry.  Pt will demonstrate subjective improvement of ADLs and recreational activities through improved score of 60 on LEFS in 6 weeks.

## 2025-04-14 ENCOUNTER — APPOINTMENT (OUTPATIENT)
Dept: PHYSICAL THERAPY | Facility: CLINIC | Age: 60
End: 2025-04-14
Payer: COMMERCIAL

## 2025-04-21 ENCOUNTER — TREATMENT (OUTPATIENT)
Dept: PHYSICAL THERAPY | Facility: CLINIC | Age: 60
End: 2025-04-21
Payer: COMMERCIAL

## 2025-04-21 DIAGNOSIS — M25.561 ACUTE PAIN OF RIGHT KNEE: ICD-10-CM

## 2025-04-21 DIAGNOSIS — M21.161 ACQUIRED GENU VARUM, RIGHT: ICD-10-CM

## 2025-04-21 DIAGNOSIS — S76.311A HAMSTRING STRAIN, RIGHT, INITIAL ENCOUNTER: ICD-10-CM

## 2025-04-21 DIAGNOSIS — M17.11 ARTHRITIS OF KNEE, RIGHT: ICD-10-CM

## 2025-04-21 PROCEDURE — 97530 THERAPEUTIC ACTIVITIES: CPT | Mod: GP | Performed by: PHYSICAL THERAPIST

## 2025-04-21 NOTE — PROGRESS NOTES
"Physical Therapy Treatment    Patient Name: Shayy Weeks  MRN: [id number]  Today's Date: 4/21/2025  Time Calculation  Start Time: 1400  Stop Time: 1434  Time Calculation (min): 34 min  PT Therapeutic Procedures Time Entry  Therapeutic Activity Time Entry: 34    Insurance:  Visit number: 5 of 7  Authorization info: 2025: UMR - NO AUTH / $30 copay per visit / $5000 OOP not met / MN visits - MN review after 25th visit / ds 3/18/25.   Insurance Type: Payor: [employer] / Plan: [employer] / Product Type: *No Product type* /     Current Problem   1. Arthritis of knee, right  Follow Up In Physical Therapy      2. Acquired genu varum, right  Follow Up In Physical Therapy      3. Hamstring strain, right, initial encounter  Follow Up In Physical Therapy      4. Acute pain of right knee  Follow Up In Physical Therapy          Subjective   General    Pt feels her knee is \"ok\". She is seeing her MD at the end of the week, she is avoiding steps which hasn't been a problem at home. She still feels that there is a shifting motion internally when she is descending stairs.   Precautions:   None  Pain    Currently very little but it varies throughout the day  Post Treatment Pain Level none given    Objective   Difficult to palpate pain area. Pt     Treatments:  Therapeutic activity:  Discussed progression and continued deficits in relation to functional tasks.   Educated on return to exercise with fear of reinjury  Discussed gym exercises with weight machine and proper use and progression - performed leg press, leg extension and leg curl this date      Assessment   Assessment:    Pt reporting only deficits with stair negotiation during increased knee flexion and intermittently. Spent this session educating patient on knee OA and need for continued strengthening to achieve stability. Also educated and utilized weight machine for potential return to gym exercises, pt verbalized understanding. Pt will be " returning to MD later this week and if cleared with return to gym activity, will potentially see pt biweekly.     Plan:   Discussed return to gym activity; biweekly visits pending MD findings     OP EDUCATION:   Discussed HEP    Goals:   Active       PT Problem       PT Goal 1 (Progressing)       Start:  03/19/25    Expected End:  06/07/25       Pt will be 100% IND with HEP in 6 weeks in order to maintain progress with therapy.   Pt will reduce pain levels to no more than 0/10 in 6 weeks in order to improve sleep, ambulation and work tasks.  Pt will be able to perform community ambulation demonstrating normalized dariusz without need of brace in 6 weeks for community needs.  Pt will be able to perform ascending/descending reciprocal pattern of 12 steps in 6 weeks for home navigation for laundry.  Pt will demonstrate subjective improvement of ADLs and recreational activities through improved score of 60 on LEFS in 6 weeks.

## 2025-04-25 ENCOUNTER — APPOINTMENT (OUTPATIENT)
Dept: ORTHOPEDIC SURGERY | Facility: CLINIC | Age: 60
End: 2025-04-25
Payer: COMMERCIAL

## 2025-04-25 DIAGNOSIS — M17.11 ARTHRITIS OF KNEE, RIGHT: ICD-10-CM

## 2025-04-25 DIAGNOSIS — M21.161 ACQUIRED GENU VARUM, RIGHT: Primary | ICD-10-CM

## 2025-04-25 PROCEDURE — 99213 OFFICE O/P EST LOW 20 MIN: CPT | Performed by: ORTHOPAEDIC SURGERY

## 2025-04-25 NOTE — PROGRESS NOTES
This is a pleasant 60 y.o. year old female who presents for fuv of right knee.  Did trade show and walked/stood a lot and did ok.  But has trouble with stairs.      PHYSICAL EXAMINATION  Constitutional Exam: patient's height and weight reviewed, well-kempt  Psychiatric Exam: alert and oriented x 3, appropriate mood and behavior  Eye Exam: NIKITA, EOMI  Pulmonary Exam: breathing non-labored, no apparent distress  Lymphatic exam: no appreciable lymphadenopathy in the lower extremities  Cardiovascular exam: DP pulses 2+ bilaterally, PT 2+ bilaterally, toes are pink with good capillary refill, no pitting edema  Skin exam: no open lesions, rashes, abrasions or ulcerations  Neurological exam: sensation to light touch intact in both lower extremities in peripheral and dermatomal distributions (except for any abnormalities noted in musculoskeletal exam)    Musculoskeletal exam: right knee: still lacking some extension but firing the quad better, stable ligamentous exam    DATA/RESULTS REVIEW: I personally reviewed the patient's x-ray images and reports of the right knee that shows genu varum, grade 4 changes in medial compartment, tricompartmental knee arthritis.     ASSESSMENT: right knee OA flare-up with underlying genu varum, some improvement in pain  PLAN: Further treatment options discussed including continue PT to work on hip and core and quad strength, updated PT order.   Work to get back as much terminal knee extension as possible and showed her an exercise to do.  Discussed with her that crepitus is due to cartilage wear and sometimes can feel like shifting or locking due to grade 4 changes in medial compartment.  Discussed option of medial offloader OA brace but she wanted to hold off at this time.  FUV prn.  The patient's questions were answered in detail.      Note dictated with [employer] software, completed without full type editing to avoid delay.

## 2025-04-28 ENCOUNTER — TREATMENT (OUTPATIENT)
Dept: PHYSICAL THERAPY | Facility: CLINIC | Age: 60
End: 2025-04-28
Payer: COMMERCIAL

## 2025-04-28 DIAGNOSIS — S76.311A HAMSTRING STRAIN, RIGHT, INITIAL ENCOUNTER: ICD-10-CM

## 2025-04-28 DIAGNOSIS — M25.561 ACUTE PAIN OF RIGHT KNEE: ICD-10-CM

## 2025-04-28 DIAGNOSIS — M17.11 ARTHRITIS OF KNEE, RIGHT: ICD-10-CM

## 2025-04-28 DIAGNOSIS — M21.161 ACQUIRED GENU VARUM, RIGHT: ICD-10-CM

## 2025-04-28 PROCEDURE — 97110 THERAPEUTIC EXERCISES: CPT | Mod: GP | Performed by: PHYSICAL THERAPIST

## 2025-04-28 NOTE — PROGRESS NOTES
"Physical Therapy Progress Note/Treatment    Patient Name: Shayy Weeks  MRN: [id number]  Today's Date: 4/28/2025  Time Calculation  Start Time: 1346  Stop Time: 1424  Time Calculation (min): 38 min  PT Therapeutic Procedures Time Entry  Therapeutic Exercise Time Entry: 38    Insurance:  Visit number: 6 of 10  Authorization info: 2025: UMR - NO AUTH / $30 copay per visit / $5000 OOP not met / MN visits - MN review after 25th visit / ds 3/18/25.   Insurance Type: Payor: [employer] / Plan: [employer] / Product Type: *No Product type* /     Current Problem   1. Arthritis of knee, right  Follow Up In Physical Therapy      2. Acquired genu varum, right  Follow Up In Physical Therapy      3. Hamstring strain, right, initial encounter  Follow Up In Physical Therapy      4. Acute pain of right knee  Follow Up In Physical Therapy          Subjective   General    Pt went to see MD who would like her to focus on terminal knee extension for ROM and quad strengthening. Pt reports she is going to try to be more compliant with HEP.   Precautions:   None  Pain    0  Post Treatment Pain Level none given    Objective   Fair eccentric tap down   Good quad contraction during standing TKE     Treatments:  Therapeutic exercise:  Seated figure 4 stretch 30\"x3   Seated HS stretch 30\"x3   Seated SB stretch 10x3  Gastroc stretch on wedge 30\"x3   Standing TKE into ball 10x3 B  4\" eccentric tap down 10x3 B   BLP #90 10x; #100 10x  R SL LP #100 10x2         Assessment   Assessment:    Pt has made good progress with reduction in pain however quad weakness and ROM deficits remain, discussed with pt who is planning on going to gym but would like to continue with therapy PT is in agreement due to continued weakness that was noted this date. Will continue with POC for 1x/week for 4 more sessions focus on quad strengthening and ROM improvement.     Plan:   1x/week for 4 more weeks     OP EDUCATION:   Discussed HEP    Goals: "   Active       PT Problem       PT Goal 1 (Progressing)       Start:  03/19/25    Expected End:  06/07/25       Pt will be 100% IND with HEP in 6 weeks in order to maintain progress with therapy.   Pt will reduce pain levels to no more than 0/10 in 6 weeks in order to improve sleep, ambulation and work tasks.  Pt will be able to perform community ambulation demonstrating normalized dariusz without need of brace in 6 weeks for community needs.  Pt will be able to perform ascending/descending reciprocal pattern of 12 steps in 6 weeks for home navigation for laundry.  Pt will demonstrate subjective improvement of ADLs and recreational activities through improved score of 60 on LEFS in 6 weeks.

## 2025-04-29 ENCOUNTER — APPOINTMENT (OUTPATIENT)
Dept: PRIMARY CARE | Facility: CLINIC | Age: 60
End: 2025-04-29
Payer: COMMERCIAL

## 2025-04-29 VITALS
HEART RATE: 78 BPM | OXYGEN SATURATION: 94 % | SYSTOLIC BLOOD PRESSURE: 117 MMHG | WEIGHT: 251.2 LBS | BODY MASS INDEX: 39.43 KG/M2 | TEMPERATURE: 98.3 F | DIASTOLIC BLOOD PRESSURE: 74 MMHG | HEIGHT: 67 IN

## 2025-04-29 DIAGNOSIS — T78.40XS ALLERGIC DISORDER, SEQUELA: ICD-10-CM

## 2025-04-29 DIAGNOSIS — I10 HYPERTENSION, UNSPECIFIED TYPE: ICD-10-CM

## 2025-04-29 DIAGNOSIS — R10.11 RUQ PAIN: ICD-10-CM

## 2025-04-29 DIAGNOSIS — Z00.00 ROUTINE GENERAL MEDICAL EXAMINATION AT A HEALTH CARE FACILITY: Primary | ICD-10-CM

## 2025-04-29 DIAGNOSIS — E66.9 OBESITY, UNSPECIFIED: ICD-10-CM

## 2025-04-29 DIAGNOSIS — G47.30 SLEEP APNEA, UNSPECIFIED TYPE: ICD-10-CM

## 2025-04-29 DIAGNOSIS — F41.9 ANXIETY: ICD-10-CM

## 2025-04-29 DIAGNOSIS — E55.9 VITAMIN D DEFICIENCY: ICD-10-CM

## 2025-04-29 PROCEDURE — 3008F BODY MASS INDEX DOCD: CPT | Performed by: FAMILY MEDICINE

## 2025-04-29 PROCEDURE — 3078F DIAST BP <80 MM HG: CPT | Performed by: FAMILY MEDICINE

## 2025-04-29 PROCEDURE — 99396 PREV VISIT EST AGE 40-64: CPT | Performed by: FAMILY MEDICINE

## 2025-04-29 PROCEDURE — 3074F SYST BP LT 130 MM HG: CPT | Performed by: FAMILY MEDICINE

## 2025-04-29 RX ORDER — BUPROPION HYDROCHLORIDE 300 MG/1
300 TABLET ORAL DAILY
Qty: 90 TABLET | Refills: 3 | Status: SHIPPED | OUTPATIENT
Start: 2025-04-29

## 2025-04-29 RX ORDER — LOSARTAN POTASSIUM 50 MG/1
50 TABLET ORAL DAILY
Qty: 90 TABLET | Refills: 3 | Status: SHIPPED | OUTPATIENT
Start: 2025-04-29

## 2025-04-29 RX ORDER — CITALOPRAM 40 MG/1
TABLET, FILM COATED ORAL
Qty: 90 TABLET | Refills: 1 | Status: SHIPPED | OUTPATIENT
Start: 2025-04-29

## 2025-04-29 RX ORDER — ATORVASTATIN CALCIUM 10 MG/1
10 TABLET, FILM COATED ORAL NIGHTLY
Qty: 90 TABLET | Refills: 1 | Status: SHIPPED | OUTPATIENT
Start: 2025-04-29

## 2025-04-29 RX ORDER — FLUTICASONE PROPIONATE 50 MCG
2 SPRAY, SUSPENSION (ML) NASAL 2 TIMES DAILY
Qty: 16 G | Refills: 11 | Status: SHIPPED | OUTPATIENT
Start: 2025-04-29 | End: 2026-04-29

## 2025-04-29 RX ORDER — AZELASTINE 1 MG/ML
2 SPRAY, METERED NASAL 2 TIMES DAILY
Qty: 30 ML | Refills: 12 | Status: SHIPPED | OUTPATIENT
Start: 2025-04-29 | End: 2026-04-29

## 2025-04-29 ASSESSMENT — ENCOUNTER SYMPTOMS
ARTHRALGIAS: 1
LOSS OF SENSATION IN FEET: 0
OCCASIONAL FEELINGS OF UNSTEADINESS: 0
DEPRESSION: 0

## 2025-04-29 ASSESSMENT — PAIN SCALES - GENERAL: PAINLEVEL_OUTOF10: 4

## 2025-04-29 ASSESSMENT — PATIENT HEALTH QUESTIONNAIRE - PHQ9
2. FEELING DOWN, DEPRESSED OR HOPELESS: NOT AT ALL
SUM OF ALL RESPONSES TO PHQ9 QUESTIONS 1 AND 2: 0
1. LITTLE INTEREST OR PLEASURE IN DOING THINGS: NOT AT ALL

## 2025-04-29 NOTE — PROGRESS NOTES
"Subjective   Patient ID: Shayy Weeks is a 60 y.o. female who presents for physical.    HPI     Review of Systems   Musculoskeletal:  Positive for arthralgias.       Objective   /74   Pulse 78   Temp 36.8 °C (98.3 °F)   Ht 1.702 m (5' 7\")   Wt 114 kg (251 lb 3.2 oz)   SpO2 94%   BMI 39.34 kg/m²     Physical Exam  Cardiovascular:      Rate and Rhythm: Regular rhythm.      Heart sounds: Normal heart sounds.   Pulmonary:      Breath sounds: Normal breath sounds.   Abdominal:      Palpations: Abdomen is soft.         Assessment/Plan     This is a 60-year-old female patient who is here for her annual physical exam her main concern today and she is stressed about it regarding her weight gain    She has been using her CPAP machine for sleep apnea has seen the cardiologist and taking all the medications that are being ordered    I renewed all her medications    She sees a gynecologist has not had a mammogram since 23 reminded patient to speak to her gynecologist she informed me that she has upcoming appointment with the gynecologist    She will be seeing the dermatologist    Regarding weight gain she is extremely stressed referred her to clinical pharmacy for possible GLP-1 she might be a candidate since she has sleep apnea back pain severe osteoarthritis of both knees    For her tennis elbow of the left elbow advised on icing as well as stretching exercises    Referred her to endocrinology weight loss clinic as well as nutritionist    Routine labs were ordered for her to get it drawn at the lab  She also complains of right upper quadrant pain slightly tender to touch Madrigal sign  ?  Positive ultrasound was ordered  Advised her to come back in 6 months         "

## 2025-04-30 LAB
25(OH)D3+25(OH)D2 SERPL-MCNC: 40 NG/ML (ref 30–100)
ALBUMIN SERPL-MCNC: 4.8 G/DL (ref 3.6–5.1)
ALP SERPL-CCNC: 82 U/L (ref 37–153)
ALT SERPL-CCNC: 20 U/L (ref 6–29)
ANION GAP SERPL CALCULATED.4IONS-SCNC: 10 MMOL/L (CALC) (ref 7–17)
AST SERPL-CCNC: 14 U/L (ref 10–35)
BASOPHILS # BLD AUTO: 43 CELLS/UL (ref 0–200)
BASOPHILS NFR BLD AUTO: 0.6 %
BILIRUB SERPL-MCNC: 0.5 MG/DL (ref 0.2–1.2)
BUN SERPL-MCNC: 21 MG/DL (ref 7–25)
CALCIUM SERPL-MCNC: 9.9 MG/DL (ref 8.6–10.4)
CHLORIDE SERPL-SCNC: 104 MMOL/L (ref 98–110)
CHOLEST SERPL-MCNC: 129 MG/DL
CHOLEST/HDLC SERPL: 2.5 (CALC)
CO2 SERPL-SCNC: 26 MMOL/L (ref 20–32)
CREAT SERPL-MCNC: 1.03 MG/DL (ref 0.5–1.05)
EGFRCR SERPLBLD CKD-EPI 2021: 62 ML/MIN/1.73M2
EOSINOPHIL # BLD AUTO: 137 CELLS/UL (ref 15–500)
EOSINOPHIL NFR BLD AUTO: 1.9 %
ERYTHROCYTE [DISTWIDTH] IN BLOOD BY AUTOMATED COUNT: 13 % (ref 11–15)
EST. AVERAGE GLUCOSE BLD GHB EST-MCNC: 117 MG/DL
EST. AVERAGE GLUCOSE BLD GHB EST-SCNC: 6.5 MMOL/L
GLUCOSE SERPL-MCNC: 85 MG/DL (ref 65–139)
HBA1C MFR BLD: 5.7 %
HCT VFR BLD AUTO: 42.3 % (ref 35–45)
HDLC SERPL-MCNC: 51 MG/DL
HGB BLD-MCNC: 14.1 G/DL (ref 11.7–15.5)
LDLC SERPL CALC-MCNC: 57 MG/DL (CALC)
LYMPHOCYTES # BLD AUTO: 2218 CELLS/UL (ref 850–3900)
LYMPHOCYTES NFR BLD AUTO: 30.8 %
MCH RBC QN AUTO: 31.1 PG (ref 27–33)
MCHC RBC AUTO-ENTMCNC: 33.3 G/DL (ref 32–36)
MCV RBC AUTO: 93.2 FL (ref 80–100)
MONOCYTES # BLD AUTO: 446 CELLS/UL (ref 200–950)
MONOCYTES NFR BLD AUTO: 6.2 %
NEUTROPHILS # BLD AUTO: 4356 CELLS/UL (ref 1500–7800)
NEUTROPHILS NFR BLD AUTO: 60.5 %
NONHDLC SERPL-MCNC: 78 MG/DL (CALC)
PLATELET # BLD AUTO: 240 THOUSAND/UL (ref 140–400)
PMV BLD REES-ECKER: 9.7 FL (ref 7.5–12.5)
POTASSIUM SERPL-SCNC: 4.6 MMOL/L (ref 3.5–5.3)
PROT SERPL-MCNC: 7.1 G/DL (ref 6.1–8.1)
RBC # BLD AUTO: 4.54 MILLION/UL (ref 3.8–5.1)
SODIUM SERPL-SCNC: 140 MMOL/L (ref 135–146)
TRIGL SERPL-MCNC: 130 MG/DL
TSH SERPL-ACNC: 0.62 MIU/L (ref 0.4–4.5)
WBC # BLD AUTO: 7.2 THOUSAND/UL (ref 3.8–10.8)

## 2025-05-05 ENCOUNTER — APPOINTMENT (OUTPATIENT)
Dept: PHARMACY | Facility: HOSPITAL | Age: 60
End: 2025-05-05
Payer: COMMERCIAL

## 2025-05-05 ENCOUNTER — APPOINTMENT (OUTPATIENT)
Dept: RADIOLOGY | Facility: HOSPITAL | Age: 60
End: 2025-05-05
Payer: COMMERCIAL

## 2025-05-05 DIAGNOSIS — G47.30 SLEEP APNEA, UNSPECIFIED TYPE: ICD-10-CM

## 2025-05-05 DIAGNOSIS — E66.812 CLASS 2 OBESITY IN ADULT, UNSPECIFIED BMI, UNSPECIFIED OBESITY TYPE, UNSPECIFIED WHETHER SERIOUS COMORBIDITY PRESENT: ICD-10-CM

## 2025-05-05 DIAGNOSIS — I10 HYPERTENSION, UNSPECIFIED TYPE: ICD-10-CM

## 2025-05-05 DIAGNOSIS — R10.11 RUQ PAIN: ICD-10-CM

## 2025-05-05 PROCEDURE — 76705 ECHO EXAM OF ABDOMEN: CPT | Performed by: RADIOLOGY

## 2025-05-05 PROCEDURE — 76705 ECHO EXAM OF ABDOMEN: CPT

## 2025-05-05 PROCEDURE — RXMED WILLOW AMBULATORY MEDICATION CHARGE

## 2025-05-05 RX ORDER — TIRZEPATIDE 2.5 MG/.5ML
2.5 INJECTION, SOLUTION SUBCUTANEOUS WEEKLY
Qty: 2 ML | Refills: 1 | Status: SHIPPED | OUTPATIENT
Start: 2025-05-05

## 2025-05-05 NOTE — PROGRESS NOTES
Clinical Pharmacy Appointment    Patient ID: Shayy Weeks is a 60 y.o. female who presents for Weight Management.    Pt is here for First appointment.      Referring Provider: Aubrie Dewey  PCP: Aubrie Dewey MD   Last visit with PCP: 4/29/25   Next visit with PCP: Not scheduled      Subjective     HPI  WEIGHT LOSS  BMI Readings from Last 3 Encounters:   04/29/25 39.34 kg/m²   02/13/25 38.37 kg/m²   11/06/24 38.48 kg/m²      Starting weight: 251 lbs. (5/5/25)  Current weight: 251 lbs.    Lifestyle  Diet: 1-3 meals/day. Variable based on schedule- cooks most meals  BK: rare  LN: sandwich, maybe with chips or cottage cheese, bean salad  DN: seasonal- protein, carb, vegetable  Snacks: crackers, pretzels, chips, sweets   Drinks: coke zero, some flavored sparkling panchal, flavored panchal, milk   Has worked with nutritionist/dietician? No  Has worked with weight management? No  Exercise:   Not at this time- varies - on and off, limited by injury   Is limited by: joint pain and recent knee injury/surgery     Other Potential Contributing Factors  Alcohol use: Average 0-1 drinks/week  Tobacco use: No  Other drug use: No  Medications that may cause weight gain: none  Mental health: No current concerns, treated with meds, no concerns   Eating Disorders: Denies Hx of any eating disorder  Comorbidities: Anxiety, Back Pain, Depressed Mood, Hyperlipidemia , Hypertension, Joint Pain, Knee Pain, and Sleep Apnea    Pertinent PMH Review:  PMH of Pancreatitis: No  PMH of Retinopathy: No  PMH of MTC: No  PMH of MEN2: No    Non-Pharmacological Therapy  Weight loss techniques attempted:  Commercial weight loss program: Weight Watchers- some success, hard time sticking with it     Pharmacological Therapy  Current Medications: None  Adverse Effects: N/A  Previous Medications: N/A     Insurance coverage of weight-loss medications? Yes, Mounjaro $25  Eligible for copay cards/programs? Yes, Commercial  Insurance    Medication Estimated Cost Expected weight loss Patient Risks and Cautions Notes   Wegovy   (semaglutide) $499/mo with savings card. 10-20% None      Zepbound   (tirzepatide) $650/mo with savings card.  Vials available at lower costs. 10-20%     Qsymia (phentermine-topiramate) $98/month via  Qsymia Engage 8-10% None Controlled substance   Contrave (bupropion-naltrexone) $99/month   via [employer] 5-10% None    Adipex   (phentermine) ~$15/month 3-5% None Controlled substance,  Short-term use only   Adarsh   (OTC Orlistat) ~$60/month 3-5%  Adverse effects likely outweigh benefit.          Drug Interactions  No relevant drug interactions were noted.    Medication System Management  Patient's preferred pharmacy: [facility].   Adherence/Organization: no concerns at this time  Affordability/Accessibility: No concerns at this time       Objective   Allergies[1]  Social History     Social History Narrative    Not on file      Medication Review  Current Outpatient Medications   Medication Instructions    atorvastatin (LIPITOR) 10 mg, oral, Nightly    azelastine (Astelin) 137 mcg (0.1 %) nasal spray 2 sprays, Each Nostril, 2 times daily, Use in each nostril as directed    b complex vitamins capsule 1 capsule, Daily    buPROPion XL (WELLBUTRIN XL) 300 mg, oral, Daily    calcium carbonate/vitamin D3 (CALCIUM 600 + D,3, ORAL) 1 tablet    citalopram (CeleXA) 40 mg tablet 1 po qd    empagliflozin (JARDIANCE) 10 mg, oral, Daily    fluticasone (Flonase) 50 mcg/actuation nasal spray 2 sprays, Each Nostril, 2 times daily, Shake gently. Before first use, prime pump. After use, clean tip and replace cap.    losartan (COZAAR) 50 mg, oral, Daily    meloxicam (MOBIC) 7.5 mg, oral, 2 times daily PRN    methocarbamol (ROBAXIN) 500 mg, oral, 4 times daily PRN    Mounjaro 2.5 mg, subcutaneous, Weekly    spironolactone (ALDACTONE) 25 mg, oral, Daily      Vitals  BP Readings from Last 2 Encounters:   04/29/25 117/74   11/06/24 109/70  "    BMI Readings from Last 1 Encounters:   04/29/25 39.34 kg/m²      Labs  A1C  Lab Results   Component Value Date    HGBA1C 5.7 (H) 04/29/2025    HGBA1C 5.7 (H) 11/07/2024     BMP  Lab Results   Component Value Date    CALCIUM 9.9 04/29/2025     04/29/2025    K 4.6 04/29/2025    CO2 26 04/29/2025     04/29/2025    BUN 21 04/29/2025    CREATININE 1.03 04/29/2025    EGFR 62 04/29/2025     LFTs  Lab Results   Component Value Date    ALT 20 04/29/2025    AST 14 04/29/2025    ALKPHOS 82 04/29/2025    BILITOT 0.5 04/29/2025     FLP  Lab Results   Component Value Date    TRIG 130 04/29/2025    CHOL 129 04/29/2025    LDLF 84 04/04/2023    LDLCALC 57 04/29/2025    HDL 51 04/29/2025     Urine Microalbumin  No results found for: \"MICROALBCREA\"  Weight Management  Wt Readings from Last 3 Encounters:   04/29/25 114 kg (251 lb 3.2 oz)   02/13/25 111 kg (245 lb)   11/06/24 111 kg (245 lb 11.2 oz)      There is no height or weight on file to calculate BMI.     Assessment/Plan   Problem List Items Addressed This Visit       Hypertension    Obesity    BMI 39.0-39.9,adult - Primary    Current regimen includes no medications. Patient's current weight reported as 251 lbs. Has lost 0 lbs (0% of TBW) since starting therapy plan. Due to insurance coverage and patient desire to lose weight, plan to start mounjaro 2.5 mg weekly.    Medication Changes:  START  Mounjaro 2.5 mg weekly    Future Considerations:  May increase dose if well tolerated.    Monitoring and Education:  Counseled patient on relevant medication mechanisms of action, expectations, side effects, duration of therapy, contraindications, administration, and monitoring parameters.  All questions and concerns addressed. Contact pharmacist with any further questions or concerns prior to next appointment.  Reviewed dietary recommendations: Healthy Plate method          Relevant Medications    tirzepatide (Mounjaro) 2.5 mg/0.5 mL pen injector    Other Relevant Orders "    Referral to Clinical Pharmacy     Other Visit Diagnoses         Sleep apnea, unspecified type                Clinical Pharmacist follow-up: 5/28/25 @ 4pm, Telehealth visit    Continue all meds under the continuation of care with the referring provider and clinical pharmacy team.    Thank you,  Jada Zamora, PharmD  Clinical Pharmacist  [phone] (Office Phone)   [phone] (Fax)   [email or web address]     Verbal consent to manage patient's drug therapy was obtained from the patient. They were informed they may decline to participate or withdraw from participation in pharmacy services at any time.         [1] No Known Allergies

## 2025-05-06 NOTE — ASSESSMENT & PLAN NOTE
Current regimen includes no medications. Patient's current weight reported as 251 lbs. Has lost 0 lbs (0% of TBW) since starting therapy plan. Due to insurance coverage and patient desire to lose weight, plan to start mounjaro 2.5 mg weekly.    Medication Changes:  START  Mounjaro 2.5 mg weekly    Future Considerations:  May increase dose if well tolerated.    Monitoring and Education:  Counseled patient on relevant medication mechanisms of action, expectations, side effects, duration of therapy, contraindications, administration, and monitoring parameters.  All questions and concerns addressed. Contact pharmacist with any further questions or concerns prior to next appointment.  Reviewed dietary recommendations: Healthy Plate method

## 2025-05-07 ENCOUNTER — PHARMACY VISIT (OUTPATIENT)
Dept: PHARMACY | Facility: CLINIC | Age: 60
End: 2025-05-07
Payer: COMMERCIAL

## 2025-05-07 ENCOUNTER — OFFICE VISIT (OUTPATIENT)
Dept: CARDIOLOGY | Facility: HOSPITAL | Age: 60
End: 2025-05-07
Payer: COMMERCIAL

## 2025-05-07 VITALS
BODY MASS INDEX: 38.77 KG/M2 | SYSTOLIC BLOOD PRESSURE: 114 MMHG | OXYGEN SATURATION: 96 % | DIASTOLIC BLOOD PRESSURE: 68 MMHG | HEART RATE: 78 BPM | WEIGHT: 247 LBS | HEIGHT: 67 IN | RESPIRATION RATE: 18 BRPM

## 2025-05-07 DIAGNOSIS — I10 PRIMARY HYPERTENSION: Primary | ICD-10-CM

## 2025-05-07 DIAGNOSIS — E78.5 DYSLIPIDEMIA: ICD-10-CM

## 2025-05-07 DIAGNOSIS — I50.32 CHRONIC DIASTOLIC HEART FAILURE: ICD-10-CM

## 2025-05-07 DIAGNOSIS — E66.812 CLASS 2 OBESITY WITHOUT SERIOUS COMORBIDITY WITH BODY MASS INDEX (BMI) OF 38.0 TO 38.9 IN ADULT, UNSPECIFIED OBESITY TYPE: ICD-10-CM

## 2025-05-07 PROCEDURE — 3074F SYST BP LT 130 MM HG: CPT | Performed by: NURSE PRACTITIONER

## 2025-05-07 PROCEDURE — 3078F DIAST BP <80 MM HG: CPT | Performed by: NURSE PRACTITIONER

## 2025-05-07 PROCEDURE — 99213 OFFICE O/P EST LOW 20 MIN: CPT | Performed by: NURSE PRACTITIONER

## 2025-05-07 PROCEDURE — 99214 OFFICE O/P EST MOD 30 MIN: CPT | Performed by: NURSE PRACTITIONER

## 2025-05-07 PROCEDURE — 1036F TOBACCO NON-USER: CPT | Performed by: NURSE PRACTITIONER

## 2025-05-07 PROCEDURE — 3008F BODY MASS INDEX DOCD: CPT | Performed by: NURSE PRACTITIONER

## 2025-05-07 NOTE — PROGRESS NOTES
Primary Care Physician: Aubrie Dewey MD  Date of Visit: 05/07/2025  1:00 PM EDT  Location of visit: [facility]     Chief Complaint:   Chief Complaint   Patient presents with    Follow-up        HPI / Summary:   Shayy Weeks is a 60 y.o. female presents for followup. Seen in collaboration with Dr. Ames. She started back exercising doing aerobic classes today for 1 hour without chest pain or dyspnea. She is going to start Mounjaro for weight loss. The patient denies chest pain, palpitations, lightheadedness, syncope, orthopnea, paroxysmal nocturnal dyspnea, lower extremity edema, or bleeding problems.              Past Medical History:  Past Medical History:   Diagnosis Date    Atypical squamous cells of undetermined significance on cytologic smear of cervix (ASC-US) 08/26/2016    ASCUS with positive high risk HPV cervical    Carpal tunnel syndrome, right upper limb 10/29/2017    Carpal tunnel syndrome of right wrist    Cervical high risk human papillomavirus (HPV) DNA test positive 06/01/2015    Cervical high risk HPV (human papillomavirus) test positive    Chronic obstructive pulmonary disease with (acute) exacerbation (Multi) 09/21/2016    COPD exacerbation    Noninfective gastroenteritis and colitis, unspecified 12/29/2016    Acute gastroenteritis    Nontoxic single thyroid nodule 09/28/2020    Solitary thyroid nodule    Osteoarthritis of knee, unspecified 04/05/2022    Osteoarthritis of knee    Other abnormal cytological findings on specimens from cervix uteri 12/19/2014    Unexplained endometrial cells on cervical Pap smear    Other muscle spasm 03/31/2016    Cervical paraspinous muscle spasm    Other muscle spasm 03/31/2016    Trapezius muscle spasm    Pain in right hand 09/07/2017    Pain of right hand    Personal history of colonic polyps 07/27/2016    History of adenomatous polyp of colon    Personal history of other diseases of the respiratory system 06/25/2014    History of acute  pharyngitis    Personal history of other diseases of the respiratory system 03/17/2014    History of acute bronchitis    Personal history of other diseases of the respiratory system 03/17/2014    History of acute sinusitis    Personal history of other endocrine, nutritional and metabolic disease 07/19/2022    History of diabetes mellitus    Personal history of other specified conditions 09/21/2016    History of wheezing    Personal history of other specified conditions 12/28/2020    History of weight gain    Personal history of other specified conditions 08/02/2013    History of abdominal pain    Radiculopathy, cervical region 03/31/2016    Cervical radiculopathy, acute    Unspecified acute lower respiratory infection 06/29/2017    Acute lower respiratory infection        Past Surgical History:  Past Surgical History:   Procedure Laterality Date    CERVICAL FUSION  08/02/2013    Cervical Vertebral Fusion    LUMBAR LAMINECTOMY  08/02/2013    Laminectomy Lumbar    OTHER SURGICAL HISTORY  08/02/2013    Oophorectomy - Bilateral (Removal Of Both Ovaries)          Social History:   reports that she quit smoking about 5 years ago. Her smoking use included cigarettes. She has a 10 pack-year smoking history. She has quit using smokeless tobacco. She reports current alcohol use of about 1.0 standard drink of alcohol per week. She reports that she does not use drugs.     Family History:  family history includes CARDIAC DISORDER in her father; Frontotemporal dementia in her mother.      Allergies:  No Active Allergies      Outpatient Medications:  Current Outpatient Medications   Medication Instructions    atorvastatin (LIPITOR) 10 mg, oral, Nightly    azelastine (Astelin) 137 mcg (0.1 %) nasal spray 2 sprays, Each Nostril, 2 times daily, Use in each nostril as directed    b complex vitamins capsule 1 capsule, Daily    buPROPion XL (WELLBUTRIN XL) 300 mg, oral, Daily    calcium carbonate/vitamin D3 (CALCIUM 600 + D,3, ORAL) 1  "tablet    citalopram (CeleXA) 40 mg tablet 1 po qd    empagliflozin (JARDIANCE) 10 mg, oral, Daily    fluticasone (Flonase) 50 mcg/actuation nasal spray 2 sprays, Each Nostril, 2 times daily, Shake gently. Before first use, prime pump. After use, clean tip and replace cap.    losartan (COZAAR) 50 mg, oral, Daily    meloxicam (MOBIC) 7.5 mg, oral, 2 times daily PRN    methocarbamol (ROBAXIN) 500 mg, oral, 4 times daily PRN    Mounjaro 2.5 mg, subcutaneous, Weekly    spironolactone (ALDACTONE) 25 mg, oral, Daily       Physical Exam:  Vitals:    05/07/25 1308   BP: 114/68   BP Location: Right arm   Patient Position: Sitting   Pulse: 78   Resp: 18   SpO2: 96%   Weight: 112 kg (247 lb)   Height: 1.702 m (5' 7\")     Wt Readings from Last 5 Encounters:   05/07/25 112 kg (247 lb)   04/29/25 114 kg (251 lb 3.2 oz)   02/13/25 111 kg (245 lb)   11/06/24 111 kg (245 lb 11.2 oz)   05/16/24 105 kg (232 lb)     Body mass index is 38.69 kg/m².     GENERAL: alert, cooperative, pleasant, in no acute distress  SKIN: warm and dry  NECK: Normal JVD, negative HJR  CARDIAC: Regular rate and rhythm with 2/6 early peaking systolic murmur, no rubs or gallops  CHEST: Normal respiratory efforts, lungs clear to auscultation bilaterally.  ABDOMEN: soft, nontender, nondistended  EXTREMITIES: no edema, +2 palpable RP bilaterally and PT pulses bilaterally       Last Labs:  Recent Labs     04/29/25  1617 11/07/24  0944 10/24/23  0854   WBC 7.2 6.8 7.1   HGB 14.1 13.8 13.3   HCT 42.3 41.7 41.1    239 234   MCV 93.2 95 97     Recent Labs     04/29/25  1617 11/07/24  0944 07/11/24  1243    139 141   K 4.6 4.4 4.3    106 107   BUN 21 19 20   CREATININE 1.03 0.91 1.00     CMP -  Lab Results   Component Value Date    CALCIUM 9.9 04/29/2025    PROT 7.1 04/29/2025    ALBUMIN 4.8 04/29/2025    AST 14 04/29/2025    ALT 20 04/29/2025    ALKPHOS 82 04/29/2025    BILITOT 0.5 04/29/2025       LIPID PANEL -   Lab Results   Component Value Date "    CHOL 129 04/29/2025    HDL 51 04/29/2025    LDLF 84 04/04/2023    TRIG 130 04/29/2025   LDL 57 4/29/25    Lab Results   Component Value Date    HGBA1C 5.7 (H) 04/29/2025       Last Cardiology Tests:  ECG:  Reviewed EKG- normal sinus rhythm Hr 73, minimal voltage criteria for LVH    Echo:  Echo Results:  Transthoracic Echo (TTE) Complete 12/04/2023    [facility], [address] [place] [address]  Tel [phone] and Fax [phone]    TRANSTHORACIC ECHOCARDIOGRAM REPORT      Patient Name:     KLARISSA PEMBERTON ANGIE      Reading Physician:  [id number] Jamaal Ames MD  Study Date:       12/4/2023           Ordering Provider:  [id number] JAMAAL AMES  MRN/PID:          [id number]            Fellow:  Accession#:       [id number]        Nurse:  Date of           1965 / [birth year]      Sonographer:        Lamonte Mcdermott RDMS  Birth/Age:        years  Gender:           F                   Additional Staff:   Elda Hartman RD  Height:           170.00 cm           Admit Date:  Weight:           114.00 kg           Admission Status:   Outpatient  BSA:              2.23 m2             Encounter#:         [id number]  [facility]  Location:           Invasive  Blood Pressure: 125 /78 mmHg    Study Type:    TRANSTHORACIC ECHO (TTE) COMPLETE  Diagnosis/ICD: Shortness of breath-R06.02  Indication:    SOB  CPT Code:      Echo Complete w Full Doppler-04491    Patient History:  Pertinent History: Cardiomyopathy and HTN.    Study Detail: The following Echo studies were performed: 2D, M-Mode, Doppler and  color flow.      PHYSICIAN INTERPRETATION:  Left Ventricle: The left ventricular systolic function is normal, with an estimated ejection fraction of 60-65%. There are no regional wall motion abnormalities. The left ventricular cavity size is normal. Spectral Doppler shows a normal pattern of left ventricular diastolic filling.  Left Atrium: The left atrium is moderately dilated.  Right Ventricle:  The right ventricle is normal in size. There is normal right ventricular global systolic function.  Right Atrium: The right atrium is normal in size.  Aortic Valve: The aortic valve is trileaflet. There is trivial aortic valve regurgitation. The peak instantaneous gradient of the aortic valve is 14.4 mmHg. The mean gradient of the aortic valve is 6.9 mmHg.  Mitral Valve: The mitral valve is normal in structure. There is mild mitral valve regurgitation.  Tricuspid Valve: The tricuspid valve is structurally normal. There is mild tricuspid regurgitation. The Doppler estimated RVSP is within normal limits at 24.7 mmHg.  Pulmonic Valve: The pulmonic valve is structurally normal. There is no indication of pulmonic valve regurgitation.  Pericardium: There is no pericardial effusion noted.  Aorta: The aortic root is normal.  In comparison to the previous echocardiogram(s): Compared with study from 11/23/2021, no significant change.      CONCLUSIONS:  1. Left ventricular systolic function is normal with a 60-65% estimated ejection fraction.  2. The left atrium is moderately dilated.  3. RVSP within normal limits.    QUANTITATIVE DATA SUMMARY:  2D MEASUREMENTS:  Normal Ranges:  IVSd:          1.06 cm   (0.6-1.1cm)  LVPWd:         0.96 cm   (0.6-1.1cm)  LVIDd:         4.82 cm   (3.9-5.9cm)  LVIDs:         3.20 cm  LV Mass Index: 78.1 g/m2  LV % FS        33.6 %    LA VOLUME:  Normal Ranges:  LA Vol A4C:        103.9 ml   (22+/-6mL/m2)  LA Vol A2C:        99.1 ml  LA Vol BP:         106.7 ml  LA Vol Index A4C:  46.7 ml/m2  LA Vol Index A2C:  44.5 ml/m2  LA Vol Index BP:   47.9 ml/m2  LA Area A4C:       26.4 cm2  LA Area A2C:       27.1 cm2  LA Major Axis A4C: 5.7 cm  LA Major Axis A2C: 6.3 cm  LA Volume Index:   47.9 ml/m2  LA Vol A4C:        91.7 ml  LA Vol A2C:        94.0 ml    RA VOLUME BY A/L METHOD:  Normal Ranges:  RA Vol A4C:        63.1 ml    (8.3-19.5ml)  RA Vol Index A4C:  28.3 ml/m2  RA Area A4C:       20.2 cm2  RA  Major Axis A4C: 5.5 cm    AORTA MEASUREMENTS:  Normal Ranges:  Ao Sinus, d: 3.00 cm (2.1-3.5cm)  Ao STJ, d:   2.60 cm (1.7-3.4cm)  Asc Ao, d:   3.10 cm (2.1-3.4cm)    LV SYSTOLIC FUNCTION BY 2D PLANIMETRY (MOD):  Normal Ranges:  EF-A4C View: 65.1 % (>=55%)  EF-A2C View: 62.4 %  EF-Biplane:  63.5 %    LV DIASTOLIC FUNCTION:  Normal Ranges:  MV Peak E:        0.83 m/s    (0.7-1.2 m/s)  MV Peak A:        0.97 m/s    (0.42-0.7 m/s)  E/A Ratio:        0.86        (1.0-2.2)  MV e'             0.10 m/s    (>8.0)  MV lateral e'     0.10 m/s  MV medial e'      0.06 m/s  MV A Dur:         200.69 msec  E/e' Ratio:       8.31        (<8.0)  PulmV Sys Davian:    59.44 cm/s  PulmV Contreras Davian:   26.23 cm/s  PulmV S/D Davian:    2.27  PulmV A Revs Davian: 29.12 cm/s  PulmV A Revs Dur: 113.03 msec    MITRAL VALVE:  Normal Ranges:  MV DT: 308 msec (150-240msec)    AORTIC VALVE:  Normal Ranges:  AoV Vmax:      1.90 m/s  (<=1.7m/s)  AoV Peak P.4 mmHg (<20mmHg)  AoV Mean P.9 mmHg  (1.7-11.5mmHg)  AoV VTI:       40.98 cm  (18-25cm)  LVOT Diameter: 2.10 cm   (1.8-2.4cm)      RIGHT VENTRICLE:  RV Basal 4.30 cm  RV Mid   3.40 cm  RV Major 6.4 cm  TAPSE:   28.0 mm  RV s'    0.13 m/s    TRICUSPID VALVE/RVSP:  Normal Ranges:  Peak TR Velocity: 2.33 m/s  Est. RA Pressure: 3 mmHg  RV Syst Pressure: 24.7 mmHg (< 30mmHg)  IVC Diam:         1.20 cm    PULMONIC VALVE:  Normal Ranges:  PV Max Davian: 1.1 m/s  (0.6-0.9m/s)  PV Max P.4 mmHg    Pulmonary Veins:  PulmV A Revs Dur: 113.03 msec  PulmV A Revs Davian: 29.12 cm/s  PulmV Contreras Davian:   26.23 cm/s  PulmV S/D Davian:    2.27  PulmV Sys Davian:    59.44 cm/s      [id number] Jamaal Ames MD  Electronically signed on 2023 at 8:42:46 AM        ** Final **               Assessment/Plan   Shayy was seen today for follow-up.  Diagnoses and all orders for this visit:  Primary hypertension (Primary)  Chronic diastolic heart failure  Dyslipidemia  Class 2 obesity without serious comorbidity with body mass index  (BMI) of 38.0 to 38.9 in adult, unspecified obesity type        In summary Ms. Weeks is a pleasant 60 year-old white female with a past medical history significant for hypertension, hyperlipidemia with a CT calcium score of 0 in 2019, obesity, obstructive sleep apnea, and prior tobacco use. She is asymptomatic from a cardiac perspective. Her blood pressure is controlled. Her recent lipid panel is at goal. She is euvolemic by clinical exam. I have encouraged her to try Mounjaro, modify diet, and increase physical activity. She will continue current cardiovascular medications. We will see her back in follow-up in 6 months. She will call sooner with questions or concerns.       Orders:  No orders of the defined types were placed in this encounter.     Followup Appts:  Future Appointments   Date Time Provider Department Center   5/8/2025  2:15 PM Jose Sneed PTA [id number] [facility]   5/16/2025  1:30 PM Jose Sneed PTA [id number] [facility]   5/22/2025  2:15 PM JOSE MARIA [id number] [facility]   5/28/2025  4:00 PM Jada Zamora, PharmD [id number] [facility]   5/29/2025  3:00 PM Wendy Smith PT [id number] [facility]   6/6/2025 10:00 AM David Livingston RDN, PUMA [id number] None   6/12/2025  9:45 AM Medina Henning APRN-CNP [id number]           ____________________________________________________________  BERNARDA Flores-CNP  [facility]  [facility]

## 2025-05-08 ENCOUNTER — TREATMENT (OUTPATIENT)
Dept: PHYSICAL THERAPY | Facility: CLINIC | Age: 60
End: 2025-05-08
Payer: COMMERCIAL

## 2025-05-08 DIAGNOSIS — S76.311A HAMSTRING STRAIN, RIGHT, INITIAL ENCOUNTER: ICD-10-CM

## 2025-05-08 DIAGNOSIS — M21.161 ACQUIRED GENU VARUM, RIGHT: ICD-10-CM

## 2025-05-08 DIAGNOSIS — M17.11 ARTHRITIS OF KNEE, RIGHT: ICD-10-CM

## 2025-05-08 DIAGNOSIS — M25.561 ACUTE PAIN OF RIGHT KNEE: ICD-10-CM

## 2025-05-08 PROCEDURE — 97110 THERAPEUTIC EXERCISES: CPT | Mod: GP,CQ

## 2025-05-08 NOTE — PROGRESS NOTES
"Physical Therapy Treatment    Patient Name: Shayy Weeks  MRN: [id number]  Today's Date: 5/8/2025  Time Calculation  Start Time: 1415  Stop Time: 1455  Time Calculation (min): 40 min  PT Therapeutic Procedures Time Entry  Therapeutic Exercise Time Entry: 40    Insurance:  Visit number: 7 of 10  Authorization info: 2025: [employer] - NO AUTH / $30 copay per visit / $5000 OOP not met / MN visits - MN review after 25th visit / ds 3/18/25.   Insurance Type: Payor: [employer] / Plan: [employer] / Product Type: *No Product type* /     Current Problem   1. Arthritis of knee, right  Follow Up In Physical Therapy      2. Acquired genu varum, right  Follow Up In Physical Therapy      3. Hamstring strain, right, initial encounter  Follow Up In Physical Therapy      4. Acute pain of right knee  Follow Up In Physical Therapy          Subjective   General    Pt and MD discussed her situation and felt an MRI was not warranted.  Precautions:   None  Pain    1  Post Treatment Pain Level 1    Objective   SLS on R =22\"    Treatments:  Therapeutic Exercise:   Nu-step x 5'   ELP #110 2 x 15   HS curls #40 3 x 10   Knee extensions #40 3 x 10   Side stepping with red band at knees 6 x 20'   Towel slides x 15   Step and holds onto BOSU x 15      Assessment   Assessment:    Pt demonstrated fair endurance with no pain in the knee with there ex. No complaints of \"shifting\" in  R knee joint.    Plan:    Continue with strengthening.    OP EDUCATION:   Added side stepping and towel slides to HEP    Goals:   Active       PT Problem       PT Goal 1 (Progressing)       Start:  03/19/25    Expected End:  06/07/25       Pt will be 100% IND with HEP in 6 weeks in order to maintain progress with therapy.   Pt will reduce pain levels to no more than 0/10 in 6 weeks in order to improve sleep, ambulation and work tasks.  Pt will be able to perform community ambulation demonstrating normalized dariusz without need of brace in 6 weeks " for community needs.  Pt will be able to perform ascending/descending reciprocal pattern of 12 steps in 6 weeks for home navigation for laundry.  Pt will demonstrate subjective improvement of ADLs and recreational activities through improved score of 60 on LEFS in 6 weeks.

## 2025-05-16 ENCOUNTER — TREATMENT (OUTPATIENT)
Dept: PHYSICAL THERAPY | Facility: CLINIC | Age: 60
End: 2025-05-16
Payer: COMMERCIAL

## 2025-05-16 DIAGNOSIS — M25.561 ACUTE PAIN OF RIGHT KNEE: ICD-10-CM

## 2025-05-16 DIAGNOSIS — M17.11 ARTHRITIS OF KNEE, RIGHT: ICD-10-CM

## 2025-05-16 DIAGNOSIS — M21.161 ACQUIRED GENU VARUM, RIGHT: ICD-10-CM

## 2025-05-16 DIAGNOSIS — S76.311A HAMSTRING STRAIN, RIGHT, INITIAL ENCOUNTER: ICD-10-CM

## 2025-05-16 PROCEDURE — 97110 THERAPEUTIC EXERCISES: CPT | Mod: GP,CQ

## 2025-05-16 NOTE — PROGRESS NOTES
"Physical Therapy Treatment    Patient Name: Shayy Weeks  MRN: [id number]  Today's Date: 5/16/2025  Time Calculation  Start Time: 1330  Stop Time: 1410  Time Calculation (min): 40 min  PT Therapeutic Procedures Time Entry  Therapeutic Exercise Time Entry: 40    Insurance:  Visit number: 8 of 10  Authorization info: 2025: [employer] - NO AUTH / $30 copay per visit / $5000 OOP not met / MN visits - MN review after 25th visit / ds 3/18/25.   Insurance Type: Payor: [employer] / Plan: [employer] / Product Type: *No Product type* /     Current Problem   1. Arthritis of knee, right  Follow Up In Physical Therapy      2. Acquired genu varum, right  Follow Up In Physical Therapy      3. Hamstring strain, right, initial encounter  Follow Up In Physical Therapy      4. Acute pain of right knee  Follow Up In Physical Therapy          Subjective   General    Pt reports minimal soreness. Does have occasional \"clicking or stuck\" feeling after bending her R knee.  Precautions:   None  Pain    0  Post Treatment Pain Level 0    Objective   Mild stiffness in R knee after there ex    Treatments:  Therapeutic Exercise:    Nu-step x 5'   Mini squats on BOSU x 10   proximal knee with band 2 x 10  Clam shells with L 3 TB 2 x 15   S/L Hip ABD with L3 band 2 x 15   Bridges with L3 band at knees off heels 2 x 15   Side stepping with red band at knees 6 x 20'   Towel slides x 15   Step and holds onto BOSU x 15    Assessment   Assessment:    Pt was challenged with new there ex in terms of muscle fatigue in R hip    Plan:    Pt will be going on her own for a few weeks and will come back to see evaluating PT. Pt is back to gym and doing aquatics as well.    OP EDUCATION:   Revised HEP    Goals:   Active       PT Problem       PT Goal 1 (Progressing)       Start:  03/19/25    Expected End:  06/07/25       Pt will be 100% IND with HEP in 6 weeks in order to maintain progress with therapy.   Pt will reduce pain levels to no more " than 0/10 in 6 weeks in order to improve sleep, ambulation and work tasks.  Pt will be able to perform community ambulation demonstrating normalized dariusz without need of brace in 6 weeks for community needs.  Pt will be able to perform ascending/descending reciprocal pattern of 12 steps in 6 weeks for home navigation for laundry.  Pt will demonstrate subjective improvement of ADLs and recreational activities through improved score of 60 on LEFS in 6 weeks.

## 2025-05-22 ENCOUNTER — APPOINTMENT (OUTPATIENT)
Dept: PHYSICAL THERAPY | Facility: CLINIC | Age: 60
End: 2025-05-22
Payer: COMMERCIAL

## 2025-05-28 ENCOUNTER — APPOINTMENT (OUTPATIENT)
Dept: PHARMACY | Facility: HOSPITAL | Age: 60
End: 2025-05-28
Payer: COMMERCIAL

## 2025-05-28 PROCEDURE — RXMED WILLOW AMBULATORY MEDICATION CHARGE

## 2025-05-28 NOTE — PROGRESS NOTES
Clinical Pharmacy Appointment    Patient ID: Shayy Weeks is a 60 y.o. female who presents for Weight Management.    Pt is here for Follow Up appointment. Patient was last seen by clinical pharmacist 5/5/25 at which time zepbound 2.5 mg weekly.     Since this appointment the patient reports that she has tolerated the medication well except for the 2nd dose. The patient reports some nausea and vomiting after the second dose but it resolved and she was able to tolerated the 3rd dose of the medication.     Patient reports a decrease in appetite and has decreased her snacking and diet soda intake. The patient reports that she has lost 10 pounds since starting the medication and she would like to continue on that dose.     Referring Provider: Aubrie Dewey  PCP: Aubrie Dewey MD   Last visit with PCP: 4/29/25   Next visit with PCP: Not scheduled      Subjective     HPI  WEIGHT LOSS  BMI Readings from Last 3 Encounters:   05/07/25 38.69 kg/m²   04/29/25 39.34 kg/m²   02/13/25 38.37 kg/m²      Starting weight: 251 lbs. (5/5/25)  Current weight: 234.7 lbs. (5/28/25)    Lifestyle  Diet: 1-3 meals/day. Variable based on schedule- cooks most meals  BK: rare  LN: sandwich, maybe with chips or cottage cheese, bean salad  DN: seasonal- protein, carb, vegetable  Snacks: crackers, pretzels, chips, sweets   Drinks: coke zero, some flavored sparkling panchal, flavored panchal, milk   Has worked with nutritionist/dietician? No  Has worked with weight management? No  Exercise:   Not at this time- varies - on and off, limited by injury   Is limited by: joint pain and recent knee injury/surgery     Other Potential Contributing Factors  Alcohol use: Average 0-1 drinks/week  Tobacco use: No  Other drug use: No  Medications that may cause weight gain: none  Mental health: No current concerns, treated with meds, no concerns   Eating Disorders: Denies Hx of any eating disorder  Comorbidities: Anxiety, Back Pain,  Depressed Mood, Hyperlipidemia , Hypertension, Joint Pain, Knee Pain, and Sleep Apnea    Pertinent PMH Review:  PMH of Pancreatitis: No  PMH of Retinopathy: No  PMH of MTC: No  PMH of MEN2: No    Non-Pharmacological Therapy  Weight loss techniques attempted:  Commercial weight loss program: Weight Watchers- some success, hard time sticking with it     Pharmacological Therapy  Current Medications: None  Adverse Effects: N/A  Previous Medications: N/A     Insurance coverage of weight-loss medications? Yes, Mounjaro $25  Eligible for copay cards/programs? Yes, Commercial Insurance    Medication Estimated Cost Expected weight loss Patient Risks and Cautions Notes   Wegovy   (semaglutide) $499/mo with savings card. 10-20% None      Zepbound   (tirzepatide) $650/mo with savings card.  Vials available at lower costs. 10-20%     Qsymia (phentermine-topiramate) $98/month via  Qsymia Engage 8-10% None Controlled substance   Contrave (bupropion-naltrexone) $99/month   via [employer] 5-10% None    Adipex   (phentermine) ~$15/month 3-5% None Controlled substance,  Short-term use only   Adarsh   (OTC Orlistat) ~$60/month 3-5%  Adverse effects likely outweigh benefit.          Drug Interactions  No relevant drug interactions were noted.    Medication System Management  Patient's preferred pharmacy: [facility].   Adherence/Organization: no concerns at this time  Affordability/Accessibility: No concerns at this time       Objective   Allergies[1]  Social History     Social History Narrative    Not on file      Medication Review  Current Outpatient Medications   Medication Instructions    atorvastatin (LIPITOR) 10 mg, oral, Nightly    azelastine (Astelin) 137 mcg (0.1 %) nasal spray 2 sprays, Each Nostril, 2 times daily, Use in each nostril as directed    b complex vitamins capsule 1 capsule, Daily    buPROPion XL (WELLBUTRIN XL) 300 mg, oral, Daily    calcium carbonate/vitamin D3 (CALCIUM 600 + D,3, ORAL) 1 tablet    citalopram  "(CeleXA) 40 mg tablet 1 po qd    empagliflozin (JARDIANCE) 10 mg, oral, Daily    fluticasone (Flonase) 50 mcg/actuation nasal spray 2 sprays, Each Nostril, 2 times daily, Shake gently. Before first use, prime pump. After use, clean tip and replace cap.    losartan (COZAAR) 50 mg, oral, Daily    meloxicam (MOBIC) 7.5 mg, oral, 2 times daily PRN    methocarbamol (ROBAXIN) 500 mg, oral, 4 times daily PRN    Mounjaro 2.5 mg, subcutaneous, Weekly    spironolactone (ALDACTONE) 25 mg, oral, Daily      Vitals  BP Readings from Last 2 Encounters:   05/07/25 114/68   04/29/25 117/74     BMI Readings from Last 1 Encounters:   05/07/25 38.69 kg/m²      Labs  A1C  Lab Results   Component Value Date    HGBA1C 5.7 (H) 04/29/2025    HGBA1C 5.7 (H) 11/07/2024     BMP  Lab Results   Component Value Date    CALCIUM 9.9 04/29/2025     04/29/2025    K 4.6 04/29/2025    CO2 26 04/29/2025     04/29/2025    BUN 21 04/29/2025    CREATININE 1.03 04/29/2025    EGFR 62 04/29/2025     LFTs  Lab Results   Component Value Date    ALT 20 04/29/2025    AST 14 04/29/2025    ALKPHOS 82 04/29/2025    BILITOT 0.5 04/29/2025     FLP  Lab Results   Component Value Date    TRIG 130 04/29/2025    CHOL 129 04/29/2025    LDLF 84 04/04/2023    LDLCALC 57 04/29/2025    HDL 51 04/29/2025     Urine Microalbumin  No results found for: \"MICROALBCREA\"  Weight Management  Wt Readings from Last 3 Encounters:   05/07/25 112 kg (247 lb)   04/29/25 114 kg (251 lb 3.2 oz)   02/13/25 111 kg (245 lb)      There is no height or weight on file to calculate BMI.     Assessment/Plan   Problem List Items Addressed This Visit       BMI 39.0-39.9,adult - Primary    Current regimen includes Zepbound 2.5 mg weekly. Patient's current weight reported as 234 lbs. Has lost 17 lbs (6.77% of TBW) since starting therapy plan. Due to insurance coverage and patient desire to lose weight, plan to continue mounjaro 2.5 mg weekly.    Medication Changes:  Continue  Mounjaro 2.5 mg " weekly    Future Considerations:  May increase dose if well tolerated.    Monitoring and Education:  Counseled patient on relevant medication mechanisms of action, expectations, side effects, duration of therapy, contraindications, administration, and monitoring parameters.  All questions and concerns addressed. Contact pharmacist with any further questions or concerns prior to next appointment.  Reviewed dietary recommendations: Healthy Plate method          Relevant Orders    Referral to Clinical Pharmacy         Clinical Pharmacist follow-up: 6/25/25 @ 9am, Telehealth visit    Continue all meds under the continuation of care with the referring provider and clinical pharmacy team.    Thank you,  Jada Zamora, PharmD  Clinical Pharmacist  [phone] (Office Phone)   [phone] (Fax)   [email or web address]     Verbal consent to manage patient's drug therapy was obtained from the patient. They were informed they may decline to participate or withdraw from participation in pharmacy services at any time.         [1] No Active Allergies

## 2025-05-28 NOTE — ASSESSMENT & PLAN NOTE
Current regimen includes Zepbound 2.5 mg weekly. Patient's current weight reported as 234 lbs. Has lost 17 lbs (6.77% of TBW) since starting therapy plan. Due to insurance coverage and patient desire to lose weight, plan to continue mounjaro 2.5 mg weekly.    Medication Changes:  Continue  Mounjaro 2.5 mg weekly    Future Considerations:  May increase dose if well tolerated.    Monitoring and Education:  Counseled patient on relevant medication mechanisms of action, expectations, side effects, duration of therapy, contraindications, administration, and monitoring parameters.  All questions and concerns addressed. Contact pharmacist with any further questions or concerns prior to next appointment.  Reviewed dietary recommendations: Healthy Plate method

## 2025-05-29 ENCOUNTER — APPOINTMENT (OUTPATIENT)
Dept: PHYSICAL THERAPY | Facility: CLINIC | Age: 60
End: 2025-05-29
Payer: COMMERCIAL

## 2025-06-02 ENCOUNTER — PHARMACY VISIT (OUTPATIENT)
Dept: PHARMACY | Facility: CLINIC | Age: 60
End: 2025-06-02
Payer: COMMERCIAL

## 2025-06-04 ENCOUNTER — APPOINTMENT (OUTPATIENT)
Dept: PHYSICAL THERAPY | Facility: CLINIC | Age: 60
End: 2025-06-04
Payer: COMMERCIAL

## 2025-06-05 ENCOUNTER — APPOINTMENT (OUTPATIENT)
Dept: RADIOLOGY | Facility: HOSPITAL | Age: 60
End: 2025-06-05
Payer: COMMERCIAL

## 2025-06-06 ENCOUNTER — APPOINTMENT (OUTPATIENT)
Facility: CLINIC | Age: 60
End: 2025-06-06
Payer: COMMERCIAL

## 2025-06-12 ENCOUNTER — APPOINTMENT (OUTPATIENT)
Dept: ENDOCRINOLOGY | Facility: CLINIC | Age: 60
End: 2025-06-12
Payer: COMMERCIAL

## 2025-06-12 VITALS
SYSTOLIC BLOOD PRESSURE: 97 MMHG | TEMPERATURE: 97.6 F | WEIGHT: 229.2 LBS | HEIGHT: 67 IN | HEART RATE: 68 BPM | DIASTOLIC BLOOD PRESSURE: 66 MMHG | BODY MASS INDEX: 35.97 KG/M2

## 2025-06-12 DIAGNOSIS — I45.81 PROLONGED QT SYNDROME: ICD-10-CM

## 2025-06-12 DIAGNOSIS — E66.812 CLASS 2 SEVERE OBESITY WITH SERIOUS COMORBIDITY AND BODY MASS INDEX (BMI) OF 35.0 TO 35.9 IN ADULT, UNSPECIFIED OBESITY TYPE: Primary | ICD-10-CM

## 2025-06-12 DIAGNOSIS — G47.30 SLEEP APNEA, UNSPECIFIED TYPE: ICD-10-CM

## 2025-06-12 DIAGNOSIS — E66.01 CLASS 2 SEVERE OBESITY WITH SERIOUS COMORBIDITY AND BODY MASS INDEX (BMI) OF 35.0 TO 35.9 IN ADULT, UNSPECIFIED OBESITY TYPE: Primary | ICD-10-CM

## 2025-06-12 DIAGNOSIS — M43.06 LUMBAR SPONDYLOLYSIS: ICD-10-CM

## 2025-06-12 DIAGNOSIS — I10 HYPERTENSION, UNSPECIFIED TYPE: ICD-10-CM

## 2025-06-12 DIAGNOSIS — R73.03 PREDIABETES: ICD-10-CM

## 2025-06-12 DIAGNOSIS — Z87.891 FORMER SMOKER: ICD-10-CM

## 2025-06-12 PROCEDURE — 3008F BODY MASS INDEX DOCD: CPT | Performed by: NURSE PRACTITIONER

## 2025-06-12 PROCEDURE — 3078F DIAST BP <80 MM HG: CPT | Performed by: NURSE PRACTITIONER

## 2025-06-12 PROCEDURE — 99204 OFFICE O/P NEW MOD 45 MIN: CPT | Performed by: NURSE PRACTITIONER

## 2025-06-12 PROCEDURE — 3074F SYST BP LT 130 MM HG: CPT | Performed by: NURSE PRACTITIONER

## 2025-06-12 ASSESSMENT — ENCOUNTER SYMPTOMS
NAUSEA: 0
PALPITATIONS: 0
DEPRESSION: 0
WHEEZING: 0
NUMBNESS: 0
POLYDIPSIA: 0
ARTHRALGIAS: 0
OCCASIONAL FEELINGS OF UNSTEADINESS: 0
CONSTIPATION: 0
NERVOUS/ANXIOUS: 0
FREQUENCY: 0
POLYPHAGIA: 0
FATIGUE: 0
SHORTNESS OF BREATH: 0
LOSS OF SENSATION IN FEET: 0
DYSPHORIC MOOD: 0

## 2025-06-12 ASSESSMENT — PATIENT HEALTH QUESTIONNAIRE - PHQ9
1. LITTLE INTEREST OR PLEASURE IN DOING THINGS: NOT AT ALL
2. FEELING DOWN, DEPRESSED OR HOPELESS: NOT AT ALL
SUM OF ALL RESPONSES TO PHQ9 QUESTIONS 1 AND 2: 0

## 2025-06-12 NOTE — PATIENT INSTRUCTIONS
Nutrition: Continue with the high fiber and high protein. Aim at 20-30 grams of protein per meal. Have the dietitian consult. See information on the Healthy Plate and Mediterranean. Look at Diabetes Food Hub  Physical Exercise: Continue with water aerobics, swimming  Medications: Continue with the Tirzepatide   Follow up:  Schedule the dietitian then have the weight management group after the iitial dietitian consult

## 2025-06-12 NOTE — PROGRESS NOTES
"Shayy Weeks presents for weight loss management and obesity consult today.  Referred to weight management by her PCP and cardiologist.  Additionally, she says she has had an ongoing challenge with her weight throughout her life. She states that she was a smoker throughout Covid and was placed on Wellbutrin for it.  She also had meniscal tear left knee and surgery 2021, this created limited mobility and also developed right knee pain.  She began water aerobics to address physical exercise with knee pain and enjoyed this but developed tendonitis of the elbow, she is now  recovered from this  and is now back to the water aerobics routine of twice a week.    She recently began Mounjaro 2.5 mg on Mother's Day 2025 and has lost 18 pounds  Patient notes she was contacted [facility]    PMH: Obestiy, HTN, Dyslipidemia, Former Smoker, Prolonged QT    Obesity History:  \"It's always been a struggle\"  Childhood or teen obesity history: yes  Age of Onset: adolescence  Lowest adult weight: 128  Highest adult weight: 247  Current weight: 247     Family history of obesity: yes    Biggest challenge with weight management: boredom eating, no ability to continue the weight loss effort  Goal: Feel better, lose weight. Create new habits    History of Weight Loss Efforts: yes  Successful weight loss techniques attempted: Weight Watchers  Unsuccessful weight loss techniques attempted: self-directed dieting    Current typical daily diet:  Recently eating a diet of high protein and high fiber  She is tracking with healthy Yashira  She has significantly decreased portion size recently  Typical Breakfast: cereal, yogurt  Typical Lunch: meat and bread  Typical Dinner: protein, vegetable, starch  Soda/latte weekly intake: water, cherry coke zero  Take out/carry out/fast food weekly: no  Portion sizes/seconds with meals: oversized before the Tirzepatide    Snacking  No further snacking with use of the Trizepatide\  Prior boredom snacking  Daytime " "snacks:   Evening snacks:       Describe Appetite (always hungry/no hunger/average): increased prior to Tirzepatide  Are you full after a meal?  Not prior to Tirzaepatide  Food Noise: prior to Tirzepatide  Emotional eater? Yes   Boredom eater? Yes \"mindless eating and compulsive eating\"    Current Exercise Habits water aerobics    Sleep patterns (insomnia, waking in night) /hours of sleep per night: she has prior dx of AMENA, she uses a CPAP nightly  H/O Sleep apnea: yes  Well rested? Yes     Increased Stressors (describe daily stress): yes      Any intake of an appetite suppressant or an anti-obesity medicine? Yes     H/O Pancreatitis: no  H/O Thyroid Medullary Cancer: no    Medical History[1]    Current Medications[2]        Review of Systems  Review of Systems   Constitutional:  Negative for fatigue.   Respiratory:  Negative for shortness of breath and wheezing.    Cardiovascular:  Negative for chest pain and palpitations.   Gastrointestinal:  Negative for constipation and nausea.   Endocrine: Negative for polydipsia, polyphagia and polyuria.   Genitourinary:  Negative for frequency and urgency.   Musculoskeletal:  Negative for arthralgias.   Skin:  Negative for rash.   Neurological:  Negative for numbness.   Psychiatric/Behavioral:  Negative for dysphoric mood. The patient is not nervous/anxious.            Objective   BP 97/66 (BP Location: Right arm, Patient Position: Sitting, BP Cuff Size: Adult)   Pulse 68   Temp 36.4 °C (97.6 °F) (Temporal)   Ht 1.702 m (5' 7\")   Wt 104 kg (229 lb 3.2 oz)   BMI 35.90 kg/m²     Physical Exam  Physical Exam  Constitutional:       Appearance: She is obese.   Cardiovascular:      Rate and Rhythm: Normal rate.   Pulmonary:      Effort: Pulmonary effort is normal.   Neurological:      Mental Status: She is alert and oriented to person, place, and time.   Psychiatric:         Mood and Affect: Mood normal.         Behavior: Behavior normal.         Thought Content: Thought " content normal.         Judgment: Judgment normal.         Lab Review  Lab Results   Component Value Date    HGBA1C 5.7 (H) 04/29/2025     Lab Results   Component Value Date    LDLCALC 57 04/29/2025     Lab Results   Component Value Date    CHOL 129 04/29/2025    CHOL 144 11/07/2024    CHOL 126 (L) 11/22/2023     Lab Results   Component Value Date    HDL 51 04/29/2025    HDL 46.2 11/07/2024    HDL 39.0 (L) 11/22/2023     Lab Results   Component Value Date    LDLCALC 57 04/29/2025    LDLCALC 68 11/07/2024    LDLCALC 60 (L) 11/22/2023     Lab Results   Component Value Date    TRIG 130 04/29/2025    TRIG 149 11/07/2024    TRIG 133 11/22/2023     FIB-4 Calculation: 0.8 at 4/29/2025  4:17 PM  Calculated from:  SGOT/AST: 14 U/L at 4/29/2025  4:17 PM  SGPT/ALT: 20 U/L at 4/29/2025  4:17 PM  Platelets: 240 Thousand/uL at 4/29/2025  4:17 PM  Age: 60 years        Weight Trends  Trends of weight reviewed in visit, see graph below:  Wt Readings from Last 3 Encounters:   06/12/25 104 kg (229 lb 3.2 oz)   05/07/25 112 kg (247 lb)   04/29/25 114 kg (251 lb 3.2 oz)   (  Assessment/Plan     Follow up and Goals:  ....      Visit length of 45 minutes      Problem List Items Addressed This Visit       Lumbar spondylolysis    Hypertension - Primary    Prolonged QT syndrome    BMI 39.0-39.9,adult    Sleep apnea    Prediabetes    Former smoker       Diet interventions: referral to dietitian for guidance in these changes  Discussed Mediterranean Diet, given pamphlet or it will be mailed, we looked at ADA plate, portion sizes, recipes. Advised to review in preparation for nutrition consult    Handouts given: yes    Exercise intervention:   We discussed the importance of incorporating resistance and free weight  lifting into physical activity routine to prevent muscle wasting with weight loss, enhance bone health, the positive role increased muscle has in burning fat at rest. We looked at examples of these types of exercise routines online.  Advised to do modifications. Advised to check with PCP if there is a h/o musculoskeletal injury or hx. We discussed benefits of walking with weight loss and as a cardio form of exercise. Gradually increase exercise to a goal of 150 minutes at least per week.        Follow Up:  Referred to nutrition or continue to work with current dietitian   Discussed obesity medications, side effects and mechanism of action reviewed with patient  Discussed physical activity: we reviewed Youtube videos for strength training, advised to use modifications for these videos, we discussed benefits of strength training and resistance bands  on bone and muscle health, we discussed walking and water aerobic options for cardio  Discussed Mediterranean Diet, given pamphlet or it will be mailed, we looked at ADA plate, portion sizes, recipes. Advised to review Mediterranean Meal Plan booklet  in preparation for nutrition consult  ....  1 month group visit and nutrition visit in person or  virtual  Please reach out if you need anything or have further questions         [1]   Past Medical History:  Diagnosis Date    Atypical squamous cells of undetermined significance on cytologic smear of cervix (ASC-US) 08/26/2016    ASCUS with positive high risk HPV cervical    Carpal tunnel syndrome, right upper limb 10/29/2017    Carpal tunnel syndrome of right wrist    Cervical high risk human papillomavirus (HPV) DNA test positive 06/01/2015    Cervical high risk HPV (human papillomavirus) test positive    Chronic obstructive pulmonary disease with (acute) exacerbation (Multi) 09/21/2016    COPD exacerbation    Noninfective gastroenteritis and colitis, unspecified 12/29/2016    Acute gastroenteritis    Nontoxic single thyroid nodule 09/28/2020    Solitary thyroid nodule    Osteoarthritis of knee, unspecified 04/05/2022    Osteoarthritis of knee    Other abnormal cytological findings on specimens from cervix uteri 12/19/2014    Unexplained endometrial  cells on cervical Pap smear    Other muscle spasm 03/31/2016    Cervical paraspinous muscle spasm    Other muscle spasm 03/31/2016    Trapezius muscle spasm    Pain in right hand 09/07/2017    Pain of right hand    Personal history of colonic polyps 07/27/2016    History of adenomatous polyp of colon    Personal history of other diseases of the respiratory system 06/25/2014    History of acute pharyngitis    Personal history of other diseases of the respiratory system 03/17/2014    History of acute bronchitis    Personal history of other diseases of the respiratory system 03/17/2014    History of acute sinusitis    Personal history of other endocrine, nutritional and metabolic disease 07/19/2022    History of diabetes mellitus    Personal history of other specified conditions 09/21/2016    History of wheezing    Personal history of other specified conditions 12/28/2020    History of weight gain    Personal history of other specified conditions 08/02/2013    History of abdominal pain    Radiculopathy, cervical region 03/31/2016    Cervical radiculopathy, acute    Unspecified acute lower respiratory infection 06/29/2017    Acute lower respiratory infection   [2]   Current Outpatient Medications   Medication Sig Dispense Refill    atorvastatin (Lipitor) 10 mg tablet Take 1 tablet (10 mg) by mouth once daily at bedtime. 90 tablet 1    azelastine (Astelin) 137 mcg (0.1 %) nasal spray Administer 2 sprays into each nostril 2 times a day. Use in each nostril as directed 30 mL 12    b complex vitamins capsule Take 1 capsule by mouth once daily.      buPROPion XL (Wellbutrin XL) 300 mg 24 hr tablet Take 1 tablet (300 mg) by mouth once daily. 90 tablet 3    calcium carbonate/vitamin D3 (CALCIUM 600 + D,3, ORAL) Take 1 tablet by mouth.      citalopram (CeleXA) 40 mg tablet 1 po qd 90 tablet 1    empagliflozin (Jardiance) 10 mg tablet Take 1 tablet (10 mg) by mouth once daily. 90 tablet 3    fluticasone (Flonase) 50  mcg/actuation nasal spray Administer 2 sprays into each nostril 2 times a day. Shake gently. Before first use, prime pump. After use, clean tip and replace cap. 16 g 11    losartan (Cozaar) 50 mg tablet Take 1 tablet (50 mg) by mouth once daily. 90 tablet 3    meloxicam (Mobic) 7.5 mg tablet Take 1 tablet (7.5 mg) by mouth 2 times a day as needed for moderate pain (4 - 6). 60 tablet 3    methocarbamol (Robaxin) 500 mg tablet Take 1 tablet (500 mg) by mouth 4 times a day as needed for muscle spasms for up to 10 days. 40 tablet 0    spironolactone (Aldactone) 25 mg tablet Take 1 tablet (25 mg) by mouth once daily. 90 tablet 3    tirzepatide (Mounjaro) 2.5 mg/0.5 mL pen injector Inject 2.5 mg under the skin 1 (one) time per week. 2 mL 1     No current facility-administered medications for this visit.

## 2025-06-18 ENCOUNTER — APPOINTMENT (OUTPATIENT)
Dept: ENDOCRINOLOGY | Facility: CLINIC | Age: 60
End: 2025-06-18
Payer: COMMERCIAL

## 2025-06-23 ENCOUNTER — APPOINTMENT (OUTPATIENT)
Dept: ENDOCRINOLOGY | Facility: CLINIC | Age: 60
End: 2025-06-23
Payer: COMMERCIAL

## 2025-06-23 VITALS — BODY MASS INDEX: 35.39 KG/M2 | WEIGHT: 225.5 LBS | HEIGHT: 67 IN

## 2025-06-23 DIAGNOSIS — E66.01 CLASS 2 SEVERE OBESITY WITH SERIOUS COMORBIDITY AND BODY MASS INDEX (BMI) OF 35.0 TO 35.9 IN ADULT, UNSPECIFIED OBESITY TYPE: ICD-10-CM

## 2025-06-23 DIAGNOSIS — E66.812 CLASS 2 SEVERE OBESITY WITH SERIOUS COMORBIDITY AND BODY MASS INDEX (BMI) OF 35.0 TO 35.9 IN ADULT, UNSPECIFIED OBESITY TYPE: ICD-10-CM

## 2025-06-23 DIAGNOSIS — Z71.3 DIETARY COUNSELING: Primary | ICD-10-CM

## 2025-06-23 PROCEDURE — 97802 MEDICAL NUTRITION INDIV IN: CPT | Performed by: DIETITIAN, REGISTERED

## 2025-06-23 NOTE — PATIENT INSTRUCTIONS
- Please refer to your book entitled: Your Mediterranean Meal Plan, and follow Mediterranean Diet eating guidelines as reviewed.  - The Healthy Plate style of eating can be a helpful tool for incorporating healthy balanced meals in appropriate portions. (Healthy Plate: Start with a 9-inch diameter plate. Fill 1/2 the plate with non-starchy vegetables, 1/4 of the plate with whole grains or starchy vegetables, and 1/4 of the plate with a lean source of protein.   - Please aim for a source of healthy protein and fiber rich foods at meals as discussed for nutrition needs as well as to help provide better satiety at meals.   - Continue to track food intakes on the Healthy Yashira following 26 point daily allowance for goals of weight loss.  - Consider pre-planning healthy meals for the week.  - Keep up the great work with aiming for 64 ounces of water daily.  - Keep up the great work with your weekly water aerobics and increase frequency of such over time.   - Follow-up as scheduled for the group classes in September.  - Follow-up with nutrition PRN prior to group class.

## 2025-06-23 NOTE — PROGRESS NOTES
"Initial Nutrition Assessment    Patient was referred to nutrition by MERY Espionsa  for weight management/desire to lose weight. Other PMHX significant for HTN, HLD, Pre-DM and Arthritis. Pertinent labs reviewed which show lipid panel WNL's and A1c of 5.7%. Currently prescribed medication Mounjaro.    Diet recall reveals a consistent meal pattern with rare skipped meals, as well as recent implementation of well balanced meals with attention to consistent lean protein, fruits/vegetables/complex CHO, and healthy fat food sources in appropriate portions to assist in achieving goals, and if maintained, weight loss likely to be achieved. Fluids meeting recommendations in type and amount with water as primary beverage. Pt is incorporating consistent physical activity, meeting lower-end of weekly recommendations. See all interventions/recommendations below as discussed during visit this day.     Patient reported symptoms: Difficulty losing weight    Anthropometrics:  Height:   Ht Readings from Last 1 Encounters:   06/12/25 1.702 m (5' 7\")      Weight:   Wt Readings from Last 10 Encounters:   06/12/25 104 kg (229 lb 3.2 oz)   05/07/25 112 kg (247 lb)   04/29/25 114 kg (251 lb 3.2 oz)   02/13/25 111 kg (245 lb)   11/06/24 111 kg (245 lb 11.2 oz)   05/16/24 105 kg (232 lb)   05/06/24 108 kg (239 lb 1.6 oz)   04/10/24 109 kg (240 lb 3.2 oz)   03/05/24 109 kg (240 lb 9.6 oz)   02/15/24 109 kg (240 lb)      Current BMI:   BMI Readings from Last 1 Encounters:   06/12/25 35.90 kg/m²        Labs:  Lab Results   Component Value Date    HGBA1C 5.7 (H) 04/29/2025      Lab Results   Component Value Date    CHOL 129 04/29/2025    CHOL 144 11/07/2024    CHOL 126 (L) 11/22/2023     Lab Results   Component Value Date    HDL 51 04/29/2025    HDL 46.2 11/07/2024    HDL 39.0 (L) 11/22/2023     Lab Results   Component Value Date    LDLCALC 57 04/29/2025    LDLCALC 68 11/07/2024    LDLCALC 60 (L) 11/22/2023     Lab Results "   Component Value Date    TRIG 130 04/29/2025    TRIG 149 11/07/2024    TRIG 133 11/22/2023       Malnutrition Screening:  Significant Unintentional weight loss: No  Eating less than 75% of usual intake for more than 2 weeks: No  Potential Signs of Inflammation: No    Recommended Malnutrition Diagnosis: No malnutrition identified    Diet Recall-  Breakfast- cereal OR yogurt with fruit   Lunch- turkey and bread ([employer] Killer bread) to make a sandwich with cottage cheese OR turkey roll ups with cheese sticks OR PB&J sandwich  Dinner- various proteins, starches and vegetables  Daily Snacks- none  Beverages- water (64 ounces daily) and Coke Zero and Flavored water, occasional milk with meals (1%)   Alcohol- occasionally   Physical Activity- water aerobics twice weekly    Other pertinent patient reported Information:  - Pt currently taking medication Mounjaro and tolerating it well. Only had GI distress one day by report.   - Recently making efforts to change diet with intakes of higher protein and fiber, reducing overall portions and tracking intakes on an marilou (Healthy marilou) that tracks points rather than calories with a 26 point allowance which self adjusts points as weight is lost    Nutrition Diagnosis: Food and nutrition-related knowledge deficit related to lack of recent exposure to information as evidenced by BMI above normative standard for age and gender.    Readiness to Learn:  Cognitive ability: Alert and oriented  Motivation to learn: Interested  Family Support: Unable to assess- family not present  Instruction provided to: Patient  Patient learns best by: Multiple methods  Factors affecting learning: None   Physical limitations affecting learning: None    Education Materials Provided:   Your Mediterranean Meal Plan Booklet    Nutrition Interventions/Recommendations for 6/23/2025:  - Please refer to your book entitled: Your Mediterranean Meal Plan, and follow Mediterranean Diet eating guidelines as  reviewed.  - The Healthy Plate style of eating can be a helpful tool for incorporating healthy balanced meals in appropriate portions. (Healthy Plate: Start with a 9-inch diameter plate. Fill 1/2 the plate with non-starchy vegetables, 1/4 of the plate with whole grains or starchy vegetables, and 1/4 of the plate with a lean source of protein.   - Please aim for a source of healthy protein and fiber rich foods at meals as discussed for nutrition needs as well as to help provide better satiety at meals.   - Continue to track food intakes on the Healthy Yashira following 26 point daily allowance for goals of weight loss.  - Consider pre-planning healthy meals for the week.  - Keep up the great work with aiming for 64 ounces of water daily.  - Keep up the great work with your weekly water aerobics and increase frequency of such over time.   - Follow-up as scheduled for the group classes in September.  - Follow-up with nutrition PRN prior to group class.      Nutrition Monitoring & Evaluation: adherence to recommendations and patient stated goals    Need for follow-up: As scheduled for [facility] ([facility]) and nutrition as needed prior    Referred by: MERY Espinosa     MNT Billing Type: Medical Nutrition Assessment, each 15 min increment, for 3 increments.    SIGNATURE:   Elena Braga RD, BRIANA, LD, [facility]                                                        DATE:   6/23/2025

## 2025-06-25 ENCOUNTER — APPOINTMENT (OUTPATIENT)
Dept: PHARMACY | Facility: HOSPITAL | Age: 60
End: 2025-06-25
Payer: COMMERCIAL

## 2025-06-25 PROCEDURE — RXMED WILLOW AMBULATORY MEDICATION CHARGE

## 2025-06-25 RX ORDER — TIRZEPATIDE 2.5 MG/.5ML
2.5 INJECTION, SOLUTION SUBCUTANEOUS WEEKLY
Qty: 2 ML | Refills: 1 | Status: SHIPPED | OUTPATIENT
Start: 2025-06-25

## 2025-06-25 NOTE — PROGRESS NOTES
Clinical Pharmacy Appointment    Patient ID: Shayy Weeks is a 60 y.o. female who presents for Weight Management.    Pt is here for Follow Up appointment. Patient was last seen by clinical pharmacist 5/28/25 at which time zepbound 2.5 mg weekly was continued.     Since this appointment the patient reports that she has been tolerating the medication well and feel as though it is decreasing her appetite well. The patient reports she would like to continue this dose at this time.         Referring Provider: Aubrie Dewey  PCP: Aubrie Dewey MD   Last visit with PCP: 4/29/25   Next visit with PCP: Not scheduled      Subjective     HPI  WEIGHT LOSS  BMI Readings from Last 3 Encounters:   06/23/25 35.32 kg/m²   06/12/25 35.90 kg/m²   05/07/25 38.69 kg/m²      Starting weight: 251 lbs. (5/5/25)  Current weight: 234.7 lbs. (5/28/25)    Lifestyle  Diet: 1-3 meals/day. Variable based on schedule- cooks most meals  BK: rare  LN: sandwich, maybe with chips or cottage cheese, bean salad  DN: seasonal- protein, carb, vegetable  Snacks: crackers, pretzels, chips, sweets   Drinks: coke zero, some flavored sparkling panchal, flavored panchal, milk   Has worked with nutritionist/dietician? No  Has worked with weight management? No  Exercise:   Not at this time- varies - on and off, limited by injury   Is limited by: joint pain and recent knee injury/surgery     Other Potential Contributing Factors  Alcohol use: Average 0-1 drinks/week  Tobacco use: No  Other drug use: No  Medications that may cause weight gain: none  Mental health: No current concerns, treated with meds, no concerns   Eating Disorders: Denies Hx of any eating disorder  Comorbidities: Anxiety, Back Pain, Depressed Mood, Hyperlipidemia , Hypertension, Joint Pain, Knee Pain, and Sleep Apnea    Pertinent PMH Review:  PMH of Pancreatitis: No  PMH of Retinopathy: No  PMH of MTC: No  PMH of MEN2: No    Non-Pharmacological Therapy  Weight loss  techniques attempted:  Commercial weight loss program: Weight Watchers- some success, hard time sticking with it     Pharmacological Therapy  Current Medications: None  Adverse Effects: N/A  Previous Medications: N/A     Insurance coverage of weight-loss medications? Yes, Mounjaro $25  Eligible for copay cards/programs? Yes, Commercial Insurance    Medication Estimated Cost Expected weight loss Patient Risks and Cautions Notes   Wegovy   (semaglutide) $499/mo with savings card. 10-20% None      Zepbound   (tirzepatide) $650/mo with savings card.  Vials available at lower costs. 10-20%     Qsymia (phentermine-topiramate) $98/month via  Qsymia Engage 8-10% None Controlled substance   Contrave (bupropion-naltrexone) $99/month   via [employer] 5-10% None    Adipex   (phentermine) ~$15/month 3-5% None Controlled substance,  Short-term use only   Adarsh   (OTC Orlistat) ~$60/month 3-5%  Adverse effects likely outweigh benefit.          Drug Interactions  No relevant drug interactions were noted.    Medication System Management  Patient's preferred pharmacy: [facility].   Adherence/Organization: no concerns at this time  Affordability/Accessibility: No concerns at this time       Objective   Allergies[1]  Social History     Social History Narrative    Not on file      Medication Review  Current Outpatient Medications   Medication Instructions    atorvastatin (LIPITOR) 10 mg, oral, Nightly    azelastine (Astelin) 137 mcg (0.1 %) nasal spray 2 sprays, Each Nostril, 2 times daily, Use in each nostril as directed    b complex vitamins capsule 1 capsule, Daily    buPROPion XL (WELLBUTRIN XL) 300 mg, oral, Daily    calcium carbonate/vitamin D3 (CALCIUM 600 + D,3, ORAL) 1 tablet    citalopram (CeleXA) 40 mg tablet 1 po qd    empagliflozin (JARDIANCE) 10 mg, oral, Daily    fluticasone (Flonase) 50 mcg/actuation nasal spray 2 sprays, Each Nostril, 2 times daily, Shake gently. Before first use, prime pump. After use, clean tip and  "replace cap.    losartan (COZAAR) 50 mg, oral, Daily    meloxicam (MOBIC) 7.5 mg, oral, 2 times daily PRN    methocarbamol (ROBAXIN) 500 mg, oral, 4 times daily PRN    Mounjaro 2.5 mg, subcutaneous, Weekly    spironolactone (ALDACTONE) 25 mg, oral, Daily      Vitals  BP Readings from Last 2 Encounters:   06/12/25 97/66   05/07/25 114/68     BMI Readings from Last 1 Encounters:   06/23/25 35.32 kg/m²      Labs  A1C  Lab Results   Component Value Date    HGBA1C 5.7 (H) 04/29/2025    HGBA1C 5.7 (H) 11/07/2024     BMP  Lab Results   Component Value Date    CALCIUM 9.9 04/29/2025     04/29/2025    K 4.6 04/29/2025    CO2 26 04/29/2025     04/29/2025    BUN 21 04/29/2025    CREATININE 1.03 04/29/2025    EGFR 62 04/29/2025     LFTs  Lab Results   Component Value Date    ALT 20 04/29/2025    AST 14 04/29/2025    ALKPHOS 82 04/29/2025    BILITOT 0.5 04/29/2025     FLP  Lab Results   Component Value Date    TRIG 130 04/29/2025    CHOL 129 04/29/2025    LDLF 84 04/04/2023    LDLCALC 57 04/29/2025    HDL 51 04/29/2025     Urine Microalbumin  No results found for: \"MICROALBCREA\"  Weight Management  Wt Readings from Last 3 Encounters:   06/23/25 102 kg (225 lb 8 oz)   06/12/25 104 kg (229 lb 3.2 oz)   05/07/25 112 kg (247 lb)      There is no height or weight on file to calculate BMI.     Assessment/Plan   Problem List Items Addressed This Visit       BMI 39.0-39.9,adult - Primary    Current regimen includes Zepbound 2.5 mg weekly. Patient's current weight reported as 234 lbs. Has lost 17 lbs (6.77% of TBW) since starting therapy plan. Due to insurance coverage and patient desire to lose weight, plan to continue mounjaro 2.5 mg weekly.    Medication Changes:  Continue  Mounjaro 2.5 mg weekly    Future Considerations:  May increase dose if well tolerated.    Monitoring and Education:  Counseled patient on relevant medication mechanisms of action, expectations, side effects, duration of therapy, contraindications, " administration, and monitoring parameters.  All questions and concerns addressed. Contact pharmacist with any further questions or concerns prior to next appointment.  Reviewed dietary recommendations: Healthy Plate method          Relevant Medications    tirzepatide (Mounjaro) 2.5 mg/0.5 mL pen injector    Other Relevant Orders    Referral to Clinical Pharmacy           Clinical Pharmacist follow-up: 7/16/25 @ 10:40am, Telehealth visit    Continue all meds under the continuation of care with the referring provider and clinical pharmacy team.    Thank you,  Jada Zamora, PharmD  Clinical Pharmacist  [phone] (Office Phone)   [phone] (Fax)   [email or web address]     Verbal consent to manage patient's drug therapy was obtained from the patient. They were informed they may decline to participate or withdraw from participation in pharmacy services at any time.         [1] No Known Allergies

## 2025-06-26 ENCOUNTER — PHARMACY VISIT (OUTPATIENT)
Dept: PHARMACY | Facility: CLINIC | Age: 60
End: 2025-06-26
Payer: COMMERCIAL

## 2025-06-27 ENCOUNTER — APPOINTMENT (OUTPATIENT)
Facility: CLINIC | Age: 60
End: 2025-06-27
Payer: COMMERCIAL

## 2025-07-01 ENCOUNTER — APPOINTMENT (OUTPATIENT)
Dept: ENDOCRINOLOGY | Facility: CLINIC | Age: 60
End: 2025-07-01
Payer: COMMERCIAL

## 2025-07-15 ENCOUNTER — DOCUMENTATION (OUTPATIENT)
Dept: PHYSICAL THERAPY | Facility: CLINIC | Age: 60
End: 2025-07-15
Payer: COMMERCIAL

## 2025-07-15 DIAGNOSIS — M17.11 ARTHRITIS OF KNEE, RIGHT: Primary | ICD-10-CM

## 2025-07-15 DIAGNOSIS — M21.161 ACQUIRED GENU VARUM, RIGHT: ICD-10-CM

## 2025-07-15 NOTE — PROGRESS NOTES
Physical Therapy    Discharge Summary    Name: Shayy Weeks  MRN: [id number]  Date: 7/15/2025    Discharge Information:   Date of discharge 7/15/2025  Date of last attended visit 5/16/25  Date of evaluation 3/19/25  Number of attended visits 8  Referred by Dr. Walton  Referred for   1. Arthritis of knee, right        2. Acquired genu varum, right            Therapy Summary: Pt and PTA discussed at last session that pt is compliant with HEP and performing gym and aquatic exercises, did not return.       Discharge Reason(s): Achieved all or the most significant goal(s)  Satisfied with progress, able to continue progress with HEP/self-management  Patient understands to contact therapist or physician for any needs or change in status    Goals:   Active       PT Problem       PT Goal 1 (Progressing)       Start:  03/19/25    Expected End:  06/07/25       Pt will be 100% IND with HEP in 6 weeks in order to maintain progress with therapy.   Pt will reduce pain levels to no more than 0/10 in 6 weeks in order to improve sleep, ambulation and work tasks.  Pt will be able to perform community ambulation demonstrating normalized dariusz without need of brace in 6 weeks for community needs.  Pt will be able to perform ascending/descending reciprocal pattern of 12 steps in 6 weeks for home navigation for laundry.  Pt will demonstrate subjective improvement of ADLs and recreational activities through improved score of 60 on LEFS in 6 weeks.

## 2025-07-16 ENCOUNTER — APPOINTMENT (OUTPATIENT)
Dept: PHARMACY | Facility: HOSPITAL | Age: 60
End: 2025-07-16
Payer: COMMERCIAL

## 2025-07-16 ENCOUNTER — PHARMACY VISIT (OUTPATIENT)
Dept: PHARMACY | Facility: CLINIC | Age: 60
End: 2025-07-16
Payer: COMMERCIAL

## 2025-07-16 PROCEDURE — RXMED WILLOW AMBULATORY MEDICATION CHARGE

## 2025-07-16 RX ORDER — TIRZEPATIDE 2.5 MG/.5ML
2.5 INJECTION, SOLUTION SUBCUTANEOUS WEEKLY
Qty: 2 ML | Refills: 1 | Status: SHIPPED | OUTPATIENT
Start: 2025-07-16

## 2025-07-16 NOTE — PROGRESS NOTES
Clinical Pharmacy Appointment    Patient ID: Shayy Weeks is a 60 y.o. female who presents for Weight Management.    Pt is here for Follow Up appointment. Patient was last seen by clinical pharmacist 6/25/25 at which time Mounjaro 2.5 mg weekly was continued.     Since this appointment the patient reports that she has been tolerating the medication well and feel as though it is decreasing her appetite well. The patient reports she would like to continue this dose at this time.     Patient reports she has noticed an increase in hunger in the past couple of days, but she reports she hasn't been following a diet or routine due to vacation and work.         Referring Provider: Aubrie Dewey  PCP: Aubrie Dewey MD   Last visit with PCP: 4/29/25   Next visit with PCP: Not scheduled      Subjective     HPI  WEIGHT LOSS  BMI Readings from Last 3 Encounters:   06/23/25 35.32 kg/m²   06/12/25 35.90 kg/m²   05/07/25 38.69 kg/m²      Starting weight: 251 lbs. (5/5/25)  Current weight: 234.7 lbs. (5/28/25)    Lifestyle  Diet: 1-3 meals/day. Variable based on schedule- cooks most meals  BK: rare  LN: sandwich, maybe with chips or cottage cheese, bean salad  DN: seasonal- protein, carb, vegetable  Snacks: crackers, pretzels, chips, sweets   Drinks: coke zero, some flavored sparkling panchal, flavored panchal, milk   Has worked with nutritionist/dietician? No  Has worked with weight management? No  Exercise:   Not at this time- varies - on and off, limited by injury   Is limited by: joint pain and recent knee injury/surgery     Other Potential Contributing Factors  Alcohol use: Average 0-1 drinks/week  Tobacco use: No  Other drug use: No  Medications that may cause weight gain: none  Mental health: No current concerns, treated with meds, no concerns   Eating Disorders: Denies Hx of any eating disorder  Comorbidities: Anxiety, Back Pain, Depressed Mood, Hyperlipidemia , Hypertension, Joint Pain, Knee Pain, and  Sleep Apnea    Pertinent PMH Review:  PMH of Pancreatitis: No  PMH of Retinopathy: No  PMH of MTC: No  PMH of MEN2: No    Non-Pharmacological Therapy  Weight loss techniques attempted:  Commercial weight loss program: Weight Watchers- some success, hard time sticking with it     Pharmacological Therapy  Current Medications: None  Adverse Effects: N/A  Previous Medications: N/A     Insurance coverage of weight-loss medications? Yes, Mounjaro $25  Eligible for copay cards/programs? Yes, Commercial Insurance    Medication Estimated Cost Expected weight loss Patient Risks and Cautions Notes   Wegovy   (semaglutide) $499/mo with savings card. 10-20% None      Zepbound   (tirzepatide) $650/mo with savings card.  Vials available at lower costs. 10-20%     Qsymia (phentermine-topiramate) $98/month via  Qsymia Engage 8-10% None Controlled substance   Contrave (bupropion-naltrexone) $99/month   via [employer] 5-10% None    Adipex   (phentermine) ~$15/month 3-5% None Controlled substance,  Short-term use only   Adarsh   (OTC Orlistat) ~$60/month 3-5%  Adverse effects likely outweigh benefit.          Drug Interactions  No relevant drug interactions were noted.    Medication System Management  Patient's preferred pharmacy: [facility].   Adherence/Organization: no concerns at this time  Affordability/Accessibility: No concerns at this time       Objective   Allergies[1]  Social History     Social History Narrative    Not on file      Medication Review  Current Outpatient Medications   Medication Instructions    atorvastatin (LIPITOR) 10 mg, oral, Nightly    azelastine (Astelin) 137 mcg (0.1 %) nasal spray 2 sprays, Each Nostril, 2 times daily, Use in each nostril as directed    b complex vitamins capsule 1 capsule, Daily    buPROPion XL (WELLBUTRIN XL) 300 mg, oral, Daily    calcium carbonate/vitamin D3 (CALCIUM 600 + D,3, ORAL) 1 tablet    citalopram (CeleXA) 40 mg tablet 1 po qd    empagliflozin (JARDIANCE) 10 mg, oral, Daily  "   fluticasone (Flonase) 50 mcg/actuation nasal spray 2 sprays, Each Nostril, 2 times daily, Shake gently. Before first use, prime pump. After use, clean tip and replace cap.    losartan (COZAAR) 50 mg, oral, Daily    meloxicam (MOBIC) 7.5 mg, oral, 2 times daily PRN    methocarbamol (ROBAXIN) 500 mg, oral, 4 times daily PRN    Mounjaro 2.5 mg, subcutaneous, Weekly    spironolactone (ALDACTONE) 25 mg, oral, Daily      Vitals  BP Readings from Last 2 Encounters:   06/12/25 97/66   05/07/25 114/68     BMI Readings from Last 1 Encounters:   06/23/25 35.32 kg/m²      Labs  A1C  Lab Results   Component Value Date    HGBA1C 5.7 (H) 04/29/2025    HGBA1C 5.7 (H) 11/07/2024     BMP  Lab Results   Component Value Date    CALCIUM 9.9 04/29/2025     04/29/2025    K 4.6 04/29/2025    CO2 26 04/29/2025     04/29/2025    BUN 21 04/29/2025    CREATININE 1.03 04/29/2025    EGFR 62 04/29/2025     LFTs  Lab Results   Component Value Date    ALT 20 04/29/2025    AST 14 04/29/2025    ALKPHOS 82 04/29/2025    BILITOT 0.5 04/29/2025     FLP  Lab Results   Component Value Date    TRIG 130 04/29/2025    CHOL 129 04/29/2025    LDLF 84 04/04/2023    LDLCALC 57 04/29/2025    HDL 51 04/29/2025     Urine Microalbumin  No results found for: \"MICROALBCREA\"  Weight Management  Wt Readings from Last 3 Encounters:   06/23/25 102 kg (225 lb 8 oz)   06/12/25 104 kg (229 lb 3.2 oz)   05/07/25 112 kg (247 lb)      There is no height or weight on file to calculate BMI.     Assessment/Plan   Problem List Items Addressed This Visit       BMI 39.0-39.9,adult    Current regimen includes Zepbound 2.5 mg weekly. Patient's current weight reported as 234 lbs. Has lost 17 lbs (6.77% of TBW) since starting therapy plan. Due to insurance coverage and patient desire to lose weight, plan to continue mounjaro 2.5 mg weekly.    Medication Changes:  Continue  Mounjaro 2.5 mg weekly    Future Considerations:  May increase dose if well tolerated.    Monitoring " and Education:  Counseled patient on relevant medication mechanisms of action, expectations, side effects, duration of therapy, contraindications, administration, and monitoring parameters.  All questions and concerns addressed. Contact pharmacist with any further questions or concerns prior to next appointment.  Reviewed dietary recommendations: Healthy Plate method                   Clinical Pharmacist follow-up: 8/20/25 @ 10:20am, Telehealth visit    Continue all meds under the continuation of care with the referring provider and clinical pharmacy team.    Thank you,  Jada Zamora, PharmD  Clinical Pharmacist  [phone] (Office Phone)   [phone] (Fax)   [email or web address]     Verbal consent to manage patient's drug therapy was obtained from the patient. They were informed they may decline to participate or withdraw from participation in pharmacy services at any time.         [1] No Known Allergies

## 2025-08-06 DIAGNOSIS — Z12.31 ENCOUNTER FOR SCREENING MAMMOGRAM FOR BREAST CANCER: ICD-10-CM

## 2025-08-19 DIAGNOSIS — I10 HYPERTENSION, UNSPECIFIED TYPE: ICD-10-CM

## 2025-08-19 RX ORDER — SPIRONOLACTONE 25 MG/1
25 TABLET ORAL DAILY
Qty: 90 TABLET | Refills: 3 | Status: SHIPPED | OUTPATIENT
Start: 2025-08-19

## 2025-08-20 ENCOUNTER — APPOINTMENT (OUTPATIENT)
Dept: PHARMACY | Facility: HOSPITAL | Age: 60
End: 2025-08-20
Payer: COMMERCIAL

## 2025-08-20 PROCEDURE — RXMED WILLOW AMBULATORY MEDICATION CHARGE

## 2025-08-20 RX ORDER — TIRZEPATIDE 5 MG/.5ML
5 INJECTION, SOLUTION SUBCUTANEOUS WEEKLY
Qty: 2 ML | Refills: 1 | Status: SHIPPED | OUTPATIENT
Start: 2025-08-20

## 2025-08-21 ENCOUNTER — PHARMACY VISIT (OUTPATIENT)
Dept: PHARMACY | Facility: CLINIC | Age: 60
End: 2025-08-21
Payer: COMMERCIAL

## 2025-09-04 ENCOUNTER — APPOINTMENT (OUTPATIENT)
Dept: ENDOCRINOLOGY | Facility: CLINIC | Age: 60
End: 2025-09-04
Payer: COMMERCIAL

## 2025-09-17 ENCOUNTER — APPOINTMENT (OUTPATIENT)
Dept: PHARMACY | Facility: HOSPITAL | Age: 60
End: 2025-09-17
Payer: COMMERCIAL

## 2025-10-27 ENCOUNTER — APPOINTMENT (OUTPATIENT)
Dept: PRIMARY CARE | Facility: CLINIC | Age: 60
End: 2025-10-27
Payer: COMMERCIAL

## 2025-11-20 ENCOUNTER — APPOINTMENT (OUTPATIENT)
Dept: ENDOCRINOLOGY | Facility: CLINIC | Age: 60
End: 2025-11-20
Payer: COMMERCIAL

## 2025-12-18 ENCOUNTER — APPOINTMENT (OUTPATIENT)
Dept: ENDOCRINOLOGY | Facility: CLINIC | Age: 60
End: 2025-12-18
Payer: COMMERCIAL

## 2025-12-23 ENCOUNTER — APPOINTMENT (OUTPATIENT)
Dept: PRIMARY CARE | Facility: CLINIC | Age: 60
End: 2025-12-23
Payer: COMMERCIAL